# Patient Record
Sex: FEMALE | Race: WHITE | Employment: FULL TIME | URBAN - METROPOLITAN AREA
[De-identification: names, ages, dates, MRNs, and addresses within clinical notes are randomized per-mention and may not be internally consistent; named-entity substitution may affect disease eponyms.]

---

## 2019-03-16 ENCOUNTER — HOSPITAL ENCOUNTER (EMERGENCY)
Age: 39
Discharge: HOME | End: 2019-03-16
Attending: EMERGENCY MEDICINE
Payer: OTHER MISCELLANEOUS

## 2019-03-16 ENCOUNTER — APPOINTMENT (OUTPATIENT)
Dept: CT IMAGING | Age: 39
End: 2019-03-16
Payer: OTHER MISCELLANEOUS

## 2019-03-16 ENCOUNTER — APPOINTMENT (OUTPATIENT)
Dept: GENERAL RADIOLOGY | Age: 39
End: 2019-03-16
Payer: OTHER MISCELLANEOUS

## 2019-03-16 VITALS
BODY MASS INDEX: 36.8 KG/M2 | TEMPERATURE: 98.2 F | DIASTOLIC BLOOD PRESSURE: 82 MMHG | OXYGEN SATURATION: 99 % | SYSTOLIC BLOOD PRESSURE: 148 MMHG | RESPIRATION RATE: 16 BRPM | WEIGHT: 200 LBS | HEIGHT: 62 IN | HEART RATE: 82 BPM
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
## 2019-03-16 LAB
ABSOLUTE EOS #: 0.2 K/UL (ref 0–0.4)
ABSOLUTE IMMATURE GRANULOCYTE: ABNORMAL K/UL (ref 0–0.3)
ABSOLUTE LYMPH #: 1.7 K/UL (ref 1–4.8)
ABSOLUTE MONO #: 0.5 K/UL (ref 0.1–1.3)
ALBUMIN SERPL-MCNC: 4 G/DL (ref 3.5–5.2)
ALBUMIN/GLOBULIN RATIO: NORMAL (ref 1–2.5)
ALP BLD-CCNC: 45 U/L (ref 35–104)
ALT SERPL-CCNC: 11 U/L (ref 5–33)
ANION GAP SERPL CALCULATED.3IONS-SCNC: 10 MMOL/L (ref 9–17)
AST SERPL-CCNC: 13 U/L
BASOPHILS # BLD: 1 % (ref 0–2)
BASOPHILS ABSOLUTE: 0.1 K/UL (ref 0–0.2)
BILIRUB SERPL-MCNC: 0.38 MG/DL (ref 0.3–1.2)
BUN BLDV-MCNC: 12 MG/DL (ref 6–20)
BUN/CREAT BLD: NORMAL (ref 9–20)
CALCIUM SERPL-MCNC: 9.3 MG/DL (ref 8.6–10.4)
CHLORIDE BLD-SCNC: 99 MMOL/L (ref 98–107)
CO2: 28 MMOL/L (ref 20–31)
CREAT SERPL-MCNC: 0.59 MG/DL (ref 0.5–0.9)
DIFFERENTIAL TYPE: ABNORMAL
EOSINOPHILS RELATIVE PERCENT: 3 % (ref 0–4)
GFR AFRICAN AMERICAN: >60 ML/MIN
GFR NON-AFRICAN AMERICAN: >60 ML/MIN
GFR SERPL CREATININE-BSD FRML MDRD: NORMAL ML/MIN/{1.73_M2}
GFR SERPL CREATININE-BSD FRML MDRD: NORMAL ML/MIN/{1.73_M2}
GLUCOSE BLD-MCNC: 98 MG/DL (ref 70–99)
HCT VFR BLD CALC: 42.6 % (ref 36–46)
HEMOGLOBIN: 14.2 G/DL (ref 12–16)
IMMATURE GRANULOCYTES: ABNORMAL %
INR BLD: 0.9
LYMPHOCYTES # BLD: 19 % (ref 24–44)
MCH RBC QN AUTO: 30.1 PG (ref 26–34)
MCHC RBC AUTO-ENTMCNC: 33.4 G/DL (ref 31–37)
MCV RBC AUTO: 90 FL (ref 80–100)
MONOCYTES # BLD: 5 % (ref 1–7)
NRBC AUTOMATED: ABNORMAL PER 100 WBC
PARTIAL THROMBOPLASTIN TIME: 29.8 SEC (ref 24–36)
PDW BLD-RTO: 14.5 % (ref 11.5–14.9)
PLATELET # BLD: 279 K/UL (ref 150–450)
PLATELET ESTIMATE: ABNORMAL
PMV BLD AUTO: 8.9 FL (ref 6–12)
POTASSIUM SERPL-SCNC: 3.7 MMOL/L (ref 3.7–5.3)
PROTHROMBIN TIME: 12.5 SEC (ref 11.8–14.6)
RBC # BLD: 4.73 M/UL (ref 4–5.2)
RBC # BLD: ABNORMAL 10*6/UL
SEG NEUTROPHILS: 72 % (ref 36–66)
SEGMENTED NEUTROPHILS ABSOLUTE COUNT: 6.4 K/UL (ref 1.3–9.1)
SODIUM BLD-SCNC: 137 MMOL/L (ref 135–144)
TOTAL PROTEIN: 6.8 G/DL (ref 6.4–8.3)
WBC # BLD: 8.8 K/UL (ref 3.5–11)
WBC # BLD: ABNORMAL 10*3/UL

## 2019-03-16 PROCEDURE — 96374 THER/PROPH/DIAG INJ IV PUSH: CPT

## 2019-03-16 PROCEDURE — 80053 COMPREHEN METABOLIC PANEL: CPT

## 2019-03-16 PROCEDURE — 36415 COLL VENOUS BLD VENIPUNCTURE: CPT

## 2019-03-16 PROCEDURE — 85025 COMPLETE CBC W/AUTO DIFF WBC: CPT

## 2019-03-16 PROCEDURE — 6360000004 HC RX CONTRAST MEDICATION: Performed by: EMERGENCY MEDICINE

## 2019-03-16 PROCEDURE — 85730 THROMBOPLASTIN TIME PARTIAL: CPT

## 2019-03-16 PROCEDURE — 2580000003 HC RX 258: Performed by: EMERGENCY MEDICINE

## 2019-03-16 PROCEDURE — 99284 EMERGENCY DEPT VISIT MOD MDM: CPT

## 2019-03-16 PROCEDURE — 85610 PROTHROMBIN TIME: CPT

## 2019-03-16 PROCEDURE — 6360000002 HC RX W HCPCS: Performed by: EMERGENCY MEDICINE

## 2019-03-16 PROCEDURE — 73562 X-RAY EXAM OF KNEE 3: CPT

## 2019-03-16 PROCEDURE — 71260 CT THORAX DX C+: CPT

## 2019-03-16 RX ORDER — FENTANYL CITRATE 50 UG/ML
50 INJECTION, SOLUTION INTRAMUSCULAR; INTRAVENOUS ONCE
Status: COMPLETED | OUTPATIENT
Start: 2019-03-16 | End: 2019-03-16

## 2019-03-16 RX ORDER — 0.9 % SODIUM CHLORIDE 0.9 %
1000 INTRAVENOUS SOLUTION INTRAVENOUS ONCE
Status: COMPLETED | OUTPATIENT
Start: 2019-03-16 | End: 2019-03-16

## 2019-03-16 RX ORDER — SODIUM CHLORIDE 0.9 % (FLUSH) 0.9 %
10 SYRINGE (ML) INJECTION PRN
Status: DISCONTINUED | OUTPATIENT
Start: 2019-03-16 | End: 2019-03-16 | Stop reason: HOSPADM

## 2019-03-16 RX ORDER — FLUOXETINE 10 MG/1
10 TABLET, FILM COATED ORAL DAILY
COMMUNITY
End: 2022-02-22

## 2019-03-16 RX ORDER — LISINOPRIL 20 MG/1
20 TABLET ORAL 2 TIMES DAILY
COMMUNITY
End: 2022-02-22

## 2019-03-16 RX ORDER — 0.9 % SODIUM CHLORIDE 0.9 %
80 INTRAVENOUS SOLUTION INTRAVENOUS ONCE
Status: COMPLETED | OUTPATIENT
Start: 2019-03-16 | End: 2019-03-16

## 2019-03-16 RX ADMIN — SODIUM CHLORIDE 1000 ML: 9 INJECTION, SOLUTION INTRAVENOUS at 10:55

## 2019-03-16 RX ADMIN — SODIUM CHLORIDE 80 ML: 9 INJECTION, SOLUTION INTRAVENOUS at 12:00

## 2019-03-16 RX ADMIN — IOVERSOL 75 ML: 741 INJECTION INTRA-ARTERIAL; INTRAVENOUS at 12:00

## 2019-03-16 RX ADMIN — FENTANYL CITRATE 50 MCG: 50 INJECTION, SOLUTION INTRAMUSCULAR; INTRAVENOUS at 10:55

## 2019-03-16 RX ADMIN — Medication 10 ML: at 12:00

## 2019-03-16 ASSESSMENT — ENCOUNTER SYMPTOMS
COUGH: 0
BACK PAIN: 0
RESPIRATORY NEGATIVE: 1
SHORTNESS OF BREATH: 0
ABDOMINAL PAIN: 1
EYES NEGATIVE: 1

## 2019-03-16 ASSESSMENT — PAIN SCALES - GENERAL
PAINLEVEL_OUTOF10: 9
PAINLEVEL_OUTOF10: 6

## 2020-02-15 ENCOUNTER — APPOINTMENT (OUTPATIENT)
Dept: GENERAL RADIOLOGY | Age: 40
End: 2020-02-15
Payer: COMMERCIAL

## 2020-02-15 ENCOUNTER — HOSPITAL ENCOUNTER (EMERGENCY)
Age: 40
Discharge: HOME | End: 2020-02-15
Attending: EMERGENCY MEDICINE
Payer: COMMERCIAL

## 2020-02-15 VITALS
HEART RATE: 71 BPM | BODY MASS INDEX: 40.75 KG/M2 | RESPIRATION RATE: 16 BRPM | SYSTOLIC BLOOD PRESSURE: 150 MMHG | TEMPERATURE: 98.2 F | HEIGHT: 63 IN | DIASTOLIC BLOOD PRESSURE: 82 MMHG | OXYGEN SATURATION: 96 % | WEIGHT: 230 LBS

## 2020-02-15 PROCEDURE — 73610 X-RAY EXAM OF ANKLE: CPT

## 2020-02-15 PROCEDURE — 6360000002 HC RX W HCPCS: Performed by: EMERGENCY MEDICINE

## 2020-02-15 PROCEDURE — 99283 EMERGENCY DEPT VISIT LOW MDM: CPT

## 2020-02-15 PROCEDURE — 6370000000 HC RX 637 (ALT 250 FOR IP): Performed by: EMERGENCY MEDICINE

## 2020-02-15 PROCEDURE — 96372 THER/PROPH/DIAG INJ SC/IM: CPT

## 2020-02-15 RX ORDER — IBUPROFEN 600 MG/1
600 TABLET ORAL EVERY 6 HOURS PRN
COMMUNITY
End: 2022-02-22

## 2020-02-15 RX ORDER — ACETAMINOPHEN 325 MG/1
650 TABLET ORAL ONCE
Status: COMPLETED | OUTPATIENT
Start: 2020-02-15 | End: 2020-02-15

## 2020-02-15 RX ORDER — AMOXICILLIN 500 MG/1
500 CAPSULE ORAL 3 TIMES DAILY
COMMUNITY
End: 2022-02-22

## 2020-02-15 RX ORDER — HYDROCODONE BITARTRATE AND ACETAMINOPHEN 5; 325 MG/1; MG/1
1 TABLET ORAL EVERY 6 HOURS PRN
COMMUNITY
End: 2022-02-22

## 2020-02-15 RX ORDER — KETOROLAC TROMETHAMINE 30 MG/ML
30 INJECTION, SOLUTION INTRAMUSCULAR; INTRAVENOUS ONCE
Status: COMPLETED | OUTPATIENT
Start: 2020-02-15 | End: 2020-02-15

## 2020-02-15 RX ADMIN — ACETAMINOPHEN 650 MG: 325 TABLET ORAL at 19:56

## 2020-02-15 RX ADMIN — KETOROLAC TROMETHAMINE 30 MG: 30 INJECTION, SOLUTION INTRAMUSCULAR at 19:56

## 2020-02-15 ASSESSMENT — PAIN SCALES - GENERAL
PAINLEVEL_OUTOF10: 10
PAINLEVEL_OUTOF10: 6
PAINLEVEL_OUTOF10: 10

## 2020-02-15 ASSESSMENT — ENCOUNTER SYMPTOMS
ALLERGIC/IMMUNOLOGIC NEGATIVE: 1
RESPIRATORY NEGATIVE: 1
EYES NEGATIVE: 1
GASTROINTESTINAL NEGATIVE: 1

## 2020-02-15 ASSESSMENT — PAIN DESCRIPTION - PAIN TYPE: TYPE: ACUTE PAIN

## 2020-02-15 ASSESSMENT — PAIN DESCRIPTION - LOCATION: LOCATION: MOUTH

## 2020-02-15 ASSESSMENT — PAIN DESCRIPTION - FREQUENCY: FREQUENCY: CONTINUOUS

## 2020-02-15 ASSESSMENT — PAIN DESCRIPTION - DESCRIPTORS: DESCRIPTORS: THROBBING

## 2020-02-16 NOTE — ED PROVIDER NOTES
eMERGENCY dEPARTMENT eNCOUnter      Pt Name: Manuel Aviles  MRN: [id number]  [place] 1980  Date of evaluation: 2/15/2020      CHIEF COMPLAINT       Chief Complaint   Patient presents with    Facial Pain     s/p fall yesterday, pain to nose and upper jaw     Ankle Pain     left medial ankle pain           HISTORY OF PRESENT ILLNESS    Manuel Aviles is a 47 y.o. female who presents emergency department with continued facial pain after she had taken a fall yesterday and knocked out a couple of teeth and fractured a couple more. Patient was actually seen at the dentist had 1 tooth extracted she has 2 chipped teeth which are probably going to need either crowns or possible replacement. She did have a nerve block at that point time that is worn off and she is complaining of a lot of pain. Dentist did put her on amoxicillin and Norco she states the pain is still 7- 8 out of 10. Dentist also did a Panorex and had mentioned that he would like her to get a repeat x-ray and that is why she is here as well. I was uncertain as to why the dentist did not tell her to come back to the office to repeat the Panorex as the facial bone x-ray that we can do here will not be as good. REVIEW OF SYSTEMS       Review of Systems   Constitutional: Negative. HENT: Positive for dental problem. Eyes: Negative. Respiratory: Negative. Cardiovascular: Negative. Gastrointestinal: Negative. Endocrine: Negative. Genitourinary: Negative. Musculoskeletal: Positive for myalgias. Skin: Negative. Allergic/Immunologic: Negative. Neurological: Negative. Psychiatric/Behavioral: Negative. PAST MEDICAL HISTORY    has a past medical history of Hypertension. SURGICAL HISTORY      has a past surgical history that includes  section and [place] tooth extraction.     CURRENT MEDICATIONS       Current Discharge Medication List      CONTINUE these medications which have NOT CHANGED    Details amoxicillin (AMOXIL) 500 MG capsule Take 500 mg by mouth 3 times daily      ibuprofen (ADVIL;MOTRIN) 600 MG tablet Take 600 mg by mouth every 6 hours as needed for Pain      HYDROcodone-acetaminophen (NORCO) 5-325 MG per tablet Take 1 tablet by mouth every 6 hours as needed for Pain. lisinopril (PRINIVIL;ZESTRIL) 20 MG tablet Take 20 mg by mouth 2 times daily      FLUoxetine (PROZAC) 10 MG tablet Take 10 mg by mouth daily             ALLERGIES     has No Known Allergies. FAMILY HISTORY     has no family status information on file. family history is not on file. SOCIAL HISTORY      reports that she has been smoking cigarettes. She has been smoking about 0.50 packs per day. She has never used smokeless tobacco. She reports previous alcohol use. She reports that she does not use drugs. PHYSICAL EXAM     INITIAL VITALS:  height is 5' 3\" (1.6 m) and weight is 104.3 kg (230 lb). Her oral temperature is 98.2 °F (36.8 °C). Her blood pressure is 154/89 (abnormal) and her pulse is 71. Her respiration is 16. Physical Exam  Vitals signs reviewed. Constitutional:       General: She is not in acute distress. Appearance: Normal appearance. She is normal weight. She is not toxic-appearing. HENT:      Head: Normocephalic. Comments: Examination of the mouth reveals a missing left central incisor a chipped right central incisor and a chipped canine as well. Nose: Nose normal.      Mouth/Throat:      Mouth: Mucous membranes are moist.   Eyes:      Extraocular Movements: Extraocular movements intact. Pupils: Pupils are equal, round, and reactive to light. Neck:      Musculoskeletal: Normal range of motion. Cardiovascular:      Rate and Rhythm: Normal rate and regular rhythm. Pulses: Normal pulses. Heart sounds: Normal heart sounds. Pulmonary:      Effort: Pulmonary effort is normal.      Breath sounds: Normal breath sounds. Abdominal:      General: Abdomen is flat.  Bowel indicated             FINAL IMPRESSION      1. Sprain of left ankle, unspecified ligament, initial encounter    2. Facial pain          DISPOSITION/PLAN   DISPOSITION Decision To Discharge    I have reviewed the disposition diagnosis with the patient and or their family/guardian. I have answered their questions and given discharge instructions. They voiced understanding of these instructions and did not have any further questions or complaints. Reevaluation: Patient is feeling symptomatically better after having gotten Toradol and Tylenol at the same time. Her ankle x-ray is negative for any acute fracture. I rewrapped her up in an Ace wrap she is to follow back up with the dentist on Monday for repeat Panorex and she is to take both her Norco and ibuprofen at the same time.     PATIENT REFERRED TO:    ReTurn to see your dentist for further disposition and treatment on Monday  In 2 days        DISCHARGE MEDICATIONS:  Current Discharge Medication List          (Please note that portions of this note were completed with a voice recognition program.  Efforts were made to edit the dictations but occasionally words are mis-transcribed.)    Harrison MD  Attending Emergency Physician           Mala Marinelli MD  02/15/20 2029

## 2022-02-21 SDOH — HEALTH STABILITY: PHYSICAL HEALTH: ON AVERAGE, HOW MANY DAYS PER WEEK DO YOU ENGAGE IN MODERATE TO STRENUOUS EXERCISE (LIKE A BRISK WALK)?: 0 DAYS

## 2022-02-24 ENCOUNTER — HOSPITAL ENCOUNTER (OUTPATIENT)
Age: 42
Setting detail: SPECIMEN
Discharge: HOME | End: 2022-02-24

## 2022-02-24 ENCOUNTER — OFFICE VISIT (OUTPATIENT)
Dept: FAMILY MEDICINE CLINIC | Age: 42
End: 2022-02-24
Payer: COMMERCIAL

## 2022-02-24 VITALS
BODY MASS INDEX: 41.46 KG/M2 | SYSTOLIC BLOOD PRESSURE: 114 MMHG | DIASTOLIC BLOOD PRESSURE: 84 MMHG | HEART RATE: 68 BPM | OXYGEN SATURATION: 98 % | HEIGHT: 63 IN | TEMPERATURE: 98.1 F | WEIGHT: 234 LBS

## 2022-02-24 DIAGNOSIS — I10 ESSENTIAL HYPERTENSION: ICD-10-CM

## 2022-02-24 DIAGNOSIS — R63.5 WEIGHT GAIN: ICD-10-CM

## 2022-02-24 DIAGNOSIS — E55.9 VITAMIN D DEFICIENCY: ICD-10-CM

## 2022-02-24 DIAGNOSIS — E78.1 HIGH BLOOD TRIGLYCERIDES: ICD-10-CM

## 2022-02-24 DIAGNOSIS — Z00.00 PREVENTATIVE HEALTH CARE: ICD-10-CM

## 2022-02-24 DIAGNOSIS — E78.5 HYPERLIPIDEMIA, UNSPECIFIED HYPERLIPIDEMIA TYPE: ICD-10-CM

## 2022-02-24 DIAGNOSIS — I10 ESSENTIAL HYPERTENSION: Primary | ICD-10-CM

## 2022-02-24 DIAGNOSIS — Z76.89 ENCOUNTER TO ESTABLISH CARE: ICD-10-CM

## 2022-02-24 DIAGNOSIS — E61.1 IRON DEFICIENCY: ICD-10-CM

## 2022-02-24 DIAGNOSIS — F41.9 ANXIETY AND DEPRESSION: ICD-10-CM

## 2022-02-24 DIAGNOSIS — F32.A ANXIETY AND DEPRESSION: ICD-10-CM

## 2022-02-24 LAB
ALBUMIN SERPL-MCNC: 4.3 G/DL (ref 3.5–5.2)
ALBUMIN/GLOBULIN RATIO: 1.9 (ref 1–2.5)
ALP BLD-CCNC: 56 U/L (ref 35–104)
ALT SERPL-CCNC: 11 U/L (ref 5–33)
ANION GAP SERPL CALCULATED.3IONS-SCNC: 16 MMOL/L (ref 9–17)
AST SERPL-CCNC: 13 U/L
BILIRUB SERPL-MCNC: 0.3 MG/DL (ref 0.3–1.2)
BUN BLDV-MCNC: 11 MG/DL (ref 6–20)
CALCIUM SERPL-MCNC: 9.6 MG/DL (ref 8.6–10.4)
CHLORIDE BLD-SCNC: 103 MMOL/L (ref 98–107)
CHOLESTEROL/HDL RATIO: 6.1
CHOLESTEROL: 262 MG/DL
CO2: 24 MMOL/L (ref 20–31)
CREAT SERPL-MCNC: 0.62 MG/DL (ref 0.5–0.9)
ESTIMATED AVERAGE GLUCOSE: 123 MG/DL
FERRITIN: 243 UG/L (ref 13–150)
FOLATE: 8 NG/ML
GFR AFRICAN AMERICAN: >60 ML/MIN
GFR NON-AFRICAN AMERICAN: >60 ML/MIN
GFR SERPL CREATININE-BSD FRML MDRD: NORMAL ML/MIN/{1.73_M2}
GLUCOSE BLD-MCNC: 95 MG/DL (ref 70–99)
HBA1C MFR BLD: 5.9 % (ref 4–6)
HCT VFR BLD CALC: 44 % (ref 36.3–47.1)
HDLC SERPL-MCNC: 43 MG/DL
HEMOGLOBIN: 14.6 G/DL (ref 11.9–15.1)
IRON SATURATION: 21 % (ref 20–55)
IRON: 62 UG/DL (ref 37–145)
LDL CHOLESTEROL: 154 MG/DL (ref 0–130)
MCH RBC QN AUTO: 29.4 PG (ref 25.2–33.5)
MCHC RBC AUTO-ENTMCNC: 33.2 G/DL (ref 28.4–34.8)
MCV RBC AUTO: 88.7 FL (ref 82.6–102.9)
NRBC AUTOMATED: 0 PER 100 WBC
PDW BLD-RTO: 14.6 % (ref 11.8–14.4)
PLATELET # BLD: 308 K/UL (ref 138–453)
PMV BLD AUTO: 10.8 FL (ref 8.1–13.5)
POTASSIUM SERPL-SCNC: 4.9 MMOL/L (ref 3.7–5.3)
RBC # BLD: 4.96 M/UL (ref 3.95–5.11)
SODIUM BLD-SCNC: 143 MMOL/L (ref 135–144)
THYROXINE, FREE: 0.65 NG/DL (ref 0.93–1.7)
TOTAL IRON BINDING CAPACITY: 289 UG/DL (ref 250–450)
TOTAL PROTEIN: 6.6 G/DL (ref 6.4–8.3)
TRIGL SERPL-MCNC: 326 MG/DL
TSH SERPL DL<=0.05 MIU/L-ACNC: 7.75 MIU/L (ref 0.3–5)
UNSATURATED IRON BINDING CAPACITY: 227 UG/DL (ref 112–347)
VITAMIN B-12: 474 PG/ML (ref 232–1245)
VITAMIN D 25-HYDROXY: 39.1 NG/ML (ref 30–100)
WBC # BLD: 11.1 K/UL (ref 3.5–11.3)

## 2022-02-24 PROCEDURE — 99205 OFFICE O/P NEW HI 60 MIN: CPT | Performed by: NURSE PRACTITIONER

## 2022-02-24 RX ORDER — MELOXICAM 15 MG/1
15 TABLET ORAL DAILY
COMMUNITY
End: 2022-03-07

## 2022-02-24 RX ORDER — FLUOXETINE HYDROCHLORIDE 20 MG/1
20 CAPSULE ORAL DAILY
COMMUNITY
End: 2022-03-25 | Stop reason: SDUPTHER

## 2022-02-24 RX ORDER — LISINOPRIL AND HYDROCHLOROTHIAZIDE 20; 12.5 MG/1; MG/1
2 TABLET ORAL DAILY
COMMUNITY
End: 2022-03-25 | Stop reason: SDUPTHER

## 2022-02-24 SDOH — ECONOMIC STABILITY: FOOD INSECURITY: WITHIN THE PAST 12 MONTHS, YOU WORRIED THAT YOUR FOOD WOULD RUN OUT BEFORE YOU GOT MONEY TO BUY MORE.: NEVER TRUE

## 2022-02-24 SDOH — ECONOMIC STABILITY: FOOD INSECURITY: WITHIN THE PAST 12 MONTHS, THE FOOD YOU BOUGHT JUST DIDN'T LAST AND YOU DIDN'T HAVE MONEY TO GET MORE.: NEVER TRUE

## 2022-02-24 ASSESSMENT — ENCOUNTER SYMPTOMS
SHORTNESS OF BREATH: 0
ABDOMINAL DISTENTION: 0
DIARRHEA: 0
NAUSEA: 0
CONSTIPATION: 0
VOMITING: 0
CHEST TIGHTNESS: 0
ABDOMINAL PAIN: 0
BACK PAIN: 0
RHINORRHEA: 0
COUGH: 0
SORE THROAT: 0

## 2022-02-24 ASSESSMENT — PATIENT HEALTH QUESTIONNAIRE - PHQ9
SUM OF ALL RESPONSES TO PHQ9 QUESTIONS 1 & 2: 0
SUM OF ALL RESPONSES TO PHQ QUESTIONS 1-9: 0
SUM OF ALL RESPONSES TO PHQ QUESTIONS 1-9: 0
10. IF YOU CHECKED OFF ANY PROBLEMS, HOW DIFFICULT HAVE THESE PROBLEMS MADE IT FOR YOU TO DO YOUR WORK, TAKE CARE OF THINGS AT HOME, OR GET ALONG WITH OTHER PEOPLE: 0
1. LITTLE INTEREST OR PLEASURE IN DOING THINGS: 0
2. FEELING DOWN, DEPRESSED OR HOPELESS: 0
5. POOR APPETITE OR OVEREATING: 0
SUM OF ALL RESPONSES TO PHQ QUESTIONS 1-9: 0
4. FEELING TIRED OR HAVING LITTLE ENERGY: 0
8. MOVING OR SPEAKING SO SLOWLY THAT OTHER PEOPLE COULD HAVE NOTICED. OR THE OPPOSITE, BEING SO FIGETY OR RESTLESS THAT YOU HAVE BEEN MOVING AROUND A LOT MORE THAN USUAL: 0
9. THOUGHTS THAT YOU WOULD BE BETTER OFF DEAD, OR OF HURTING YOURSELF: 0
7. TROUBLE CONCENTRATING ON THINGS, SUCH AS READING THE NEWSPAPER OR WATCHING TELEVISION: 0
3. TROUBLE FALLING OR STAYING ASLEEP: 0
SUM OF ALL RESPONSES TO PHQ QUESTIONS 1-9: 0
6. FEELING BAD ABOUT YOURSELF - OR THAT YOU ARE A FAILURE OR HAVE LET YOURSELF OR YOUR FAMILY DOWN: 0

## 2022-02-24 ASSESSMENT — COLUMBIA-SUICIDE SEVERITY RATING SCALE - C-SSRS
2. HAVE YOU ACTUALLY HAD ANY THOUGHTS OF KILLING YOURSELF?: NO
1. WITHIN THE PAST MONTH, HAVE YOU WISHED YOU WERE DEAD OR WISHED YOU COULD GO TO SLEEP AND NOT WAKE UP?: NO
6. HAVE YOU EVER DONE ANYTHING, STARTED TO DO ANYTHING, OR PREPARED TO DO ANYTHING TO END YOUR LIFE?: NO

## 2022-02-24 ASSESSMENT — SOCIAL DETERMINANTS OF HEALTH (SDOH): HOW HARD IS IT FOR YOU TO PAY FOR THE VERY BASICS LIKE FOOD, HOUSING, MEDICAL CARE, AND HEATING?: NOT HARD AT ALL

## 2022-02-24 NOTE — PROGRESS NOTES
Del Lu, APRN-[facility]  [address] [facility]  [address] [address], [address]  [address] [address]  Dept: [phone]  Dept Fax: [phone]    Patient ID: Prabha Mcclelland is a 38 y.o. female. HPI:  Prabha Mcclelland is a 38 y.o. female who presents to the office today for a first visit and to establish a relationship with a new primary care physician. Today, the patient denies any complaints. Pt denies any fever or chills. Pt today denies any HA, chest pain, or SOB. Pt denies any N/V/D/C or abdominal pain. Patient does report a history of HTN (on Lisinopril-HCTZ 20-12.5 mg daily), HLD (not currently taking any medication for this), Hypothyroidism (was taking Synthroid 125 mcg daily but unsure as to why; she has not been taking it for the last 6 weeks), anxiety and depression (on Prozac 20 mg daily) and vitamin d deficiency. Patient reports compliance with all medications and is tolerating them without side effects. She does report frustration with her inability to loose weight despite diet and exercise. She is inquiring about Adipex. Previous office notes, labs and diagnostic studies were reviewed prior to and during encounter. The patient's past medical, surgical, social, and family history as well as her current medications and allergies were reviewed as documented in today's encounter by ABRAHAM Huerta.          Preventative Care include:   o Colonoscopy: N/A   o Mammogram: Denies   o Gynecological: Has not seen anyone in years; previous patient of Dr. Katrina Castillo  o Smoking history: Denies   o Alcohol consumption: socially  o Drug use: Denies     Current Outpatient Medications on File Prior to Visit   Medication Sig Dispense Refill    lisinopril-hydroCHLOROthiazide (PRINZIDE;ZESTORETIC) 20-12.5 MG per tablet Take 2 tablets by mouth daily      FLUoxetine (PROZAC) 20 MG capsule Take 20 mg by mouth daily      meloxicam (MOBIC) 15 MG tablet Take 15 mg by mouth daily      VITAMIN D PO Take 10,000 Units by mouth daily      COLLAGEN PO Take by mouth       No current facility-administered medications on file prior to visit. SUBJECTIVE:      Review of Systems   Constitutional: Positive for unexpected weight change (inability to loose weight despite diet and exercise). Negative for activity change, fatigue and fever. HENT: Negative for congestion, ear pain, rhinorrhea and sore throat. Respiratory: Negative for cough, chest tightness and shortness of breath. Cardiovascular: Negative for chest pain and palpitations. Gastrointestinal: Negative for abdominal distention, abdominal pain, constipation, diarrhea, nausea and vomiting. Endocrine: Negative for polydipsia, polyphagia and polyuria. Genitourinary: Negative for difficulty urinating and dysuria. Musculoskeletal: Positive for arthralgias (stable on Meloxicam 15 mg daily) and myalgias (stable on Meloxicam 15 mg dialy). Negative for back pain. Skin: Negative for rash. Neurological: Negative for dizziness, weakness, light-headedness and headaches. Hematological: Negative for adenopathy. Psychiatric/Behavioral: Positive for dysphoric mood (stable on Prozac 20 mg daily). Negative for agitation and behavioral problems. The patient is nervous/anxious (stable on Prozac 20 mg daily). OBJECTIVE: /84   Pulse 68   Temp 98.1 °F (36.7 °C)   Ht 5' 3\" (1.6 m)   Wt 234 lb (106.1 kg)   LMP 02/17/2022   SpO2 98%   Breastfeeding No   BMI 41.45 kg/m²     Physical Exam  Vitals and nursing note reviewed. Constitutional:       General: She is not in acute distress. Appearance: Normal appearance. She is well-developed. HENT:      Head: Normocephalic and atraumatic. Cardiovascular:      Rate and Rhythm: Normal rate and regular rhythm. Heart sounds: Normal heart sounds. No murmur heard. Pulmonary:      Effort: Pulmonary effort is normal. No respiratory distress.       Breath sounds: Normal breath sounds. Chest:      Chest wall: No tenderness. Abdominal:      General: Bowel sounds are normal.      Palpations: Abdomen is soft. Tenderness: There is no abdominal tenderness. Musculoskeletal:         General: Normal range of motion. Cervical back: Normal range of motion. Right lower leg: No edema. Left lower leg: No edema. Skin:     General: Skin is warm and dry. Findings: No rash. Neurological:      Mental Status: She is alert and oriented to person, place, and time. Psychiatric:         Mood and Affect: Mood normal.         Behavior: Behavior is cooperative. ASSESSMENT:   Diagnosis Orders   1. Essential hypertension  CBC    Comprehensive Metabolic Panel    Lipid Panel   2. Encounter to establish care     3. Preventative health care  CBC    Comprehensive Metabolic Panel    Hemoglobin A1C    Lipid Panel   4. Hyperlipidemia, unspecified hyperlipidemia type  CBC    Comprehensive Metabolic Panel    Lipid Panel   5. High blood triglycerides     6. Anxiety and depression  TSH    T4, Free   7. Vitamin D deficiency  Vitamin D 25 Hydroxy   8. Iron deficiency  Iron and TIBC    Vitamin B12 & Folate    Ferritin   9. Weight gain        PLAN:  1. Encounter to establish care  2. Preventative health care  - Previous patient of Dr. Bart Butts  - Last encounter was April 2021  - We did discuss in detail the and the recommended preventative screening guidelines (HTN, HLD, DM, breast, cervical cancer, colorectal cancer and osteoporosis). - Detailed education was provided on the patient's after visit summary.  - Will order above noted labs and discuss them at follow up visit. - Has not had a PAP in a couple of years. Her last GYN was Dr. Yakov Jackson  - Did give her the option of scheduling with me or referring to GYN  - Annual mammograms as recommended beginning at the age of 36, unless otherwise indicated. - CBC; Future  - Comprehensive Metabolic Panel;  Future  - Hemoglobin A1C; Future  - Lipid Panel; Future    3. Essential hypertension  - Stable: Medication re-filled as needed, con't medications as prescribed, con't current tx plan  - BP in office today noted; 114/84  - Continue Lisinopril/HCTZ 20-12.5 mg daily as previously prescribed. - CBC; Future  - Comprehensive Metabolic Panel; Future  - Lipid Panel; Future    4. Hyperlipidemia, unspecified hyperlipidemia type  5. High blood triglycerides  - Total Cholesterol (228) and LDL (146) noted from 2021 in [facility]  - Triglycerides (201) noted in Care Everywhere  - Will order updated labs and call with results  - Please have patient decrease, fats, carbohydrates, and engage in a healthier diet overall.  - Increase exercise and aim for 150 minutes of aerobic activity per week. - CBC; Future  - Comprehensive Metabolic Panel; Future  - Lipid Panel; Future    6. Anxiety and depression  - Stable: Medication re-filled as needed, con't medications as prescribed, con't current tx plan  - Continue with Prozac 20 mg daily  as previously prescribed. - Pt encouraged to deep breath and relax through anxious moments. - Offered reassurance and allowed to ventilate feelings and ask questions.   - Non-pharmological coping methods encouraged. - TSH; Future  - T4, Free; Future    7. Vitamin D deficiency  - Vitamin D level (10.1) noted from 2021 in [facility]  - Will order updated labs and call with results. - Vitamin D 25 Hydroxy; Future    8. Iron deficiency  - Will order updated labs and call with results  - Iron and TIBC; Future  - Vitamin B12 & Folate; Future  - Ferritin; Future    9.  Weight gain  - We did discuss dietary modifications, exercise and low carb diet  - Will have her back in a month to discuss the possibility of starting Adipex     - Medications, labs, diagnostic studies, consultations and follow-up as documented in this encounter.     - On this date February 24, 2022,  I have spent greater than 50% of this 60 minute visit reviewing previous notes, test results and/or face to face with the patient discussing the diagnoses, importance of compliance with the treatment plan, counseling, coordinating care as well as documenting on the day of the visit.      Jasper Mathis, MARCOS-CNP

## 2022-03-07 RX ORDER — MELOXICAM 15 MG/1
TABLET ORAL
Qty: 90 TABLET | Refills: 1 | Status: SHIPPED | OUTPATIENT
Start: 2022-03-07 | End: 2022-03-25 | Stop reason: ALTCHOICE

## 2022-03-25 ENCOUNTER — OFFICE VISIT (OUTPATIENT)
Dept: FAMILY MEDICINE CLINIC | Age: 42
End: 2022-03-25
Payer: COMMERCIAL

## 2022-03-25 VITALS
OXYGEN SATURATION: 98 % | DIASTOLIC BLOOD PRESSURE: 78 MMHG | WEIGHT: 229.4 LBS | HEART RATE: 83 BPM | BODY MASS INDEX: 40.64 KG/M2 | SYSTOLIC BLOOD PRESSURE: 110 MMHG | TEMPERATURE: 97.9 F

## 2022-03-25 DIAGNOSIS — R63.5 WEIGHT GAIN: ICD-10-CM

## 2022-03-25 DIAGNOSIS — I10 ESSENTIAL HYPERTENSION: Primary | ICD-10-CM

## 2022-03-25 DIAGNOSIS — F32.A ANXIETY AND DEPRESSION: ICD-10-CM

## 2022-03-25 DIAGNOSIS — F41.9 ANXIETY AND DEPRESSION: ICD-10-CM

## 2022-03-25 PROCEDURE — 99214 OFFICE O/P EST MOD 30 MIN: CPT | Performed by: NURSE PRACTITIONER

## 2022-03-25 RX ORDER — CELECOXIB 100 MG/1
CAPSULE ORAL
COMMUNITY
Start: 2022-03-16 | End: 2022-06-22 | Stop reason: ALTCHOICE

## 2022-03-25 RX ORDER — LISINOPRIL AND HYDROCHLOROTHIAZIDE 20; 12.5 MG/1; MG/1
2 TABLET ORAL DAILY
Qty: 90 TABLET | Refills: 1 | Status: SHIPPED | OUTPATIENT
Start: 2022-03-25 | End: 2022-03-31 | Stop reason: SDUPTHER

## 2022-03-25 RX ORDER — PHENTERMINE HYDROCHLORIDE 37.5 MG/1
37.5 TABLET ORAL
Qty: 30 TABLET | Refills: 0 | Status: SHIPPED | OUTPATIENT
Start: 2022-03-25 | End: 2022-04-24

## 2022-03-25 RX ORDER — FLUOXETINE HYDROCHLORIDE 20 MG/1
20 CAPSULE ORAL DAILY
Qty: 90 CAPSULE | Refills: 1 | Status: SHIPPED | OUTPATIENT
Start: 2022-03-25 | End: 2022-08-03

## 2022-03-25 RX ORDER — PNV NO.95/FERROUS FUM/FOLIC AC 28MG-0.8MG
TABLET ORAL
COMMUNITY
Start: 2022-02-25

## 2022-03-25 RX ORDER — AMPICILLIN TRIHYDRATE 250 MG
CAPSULE ORAL
COMMUNITY
Start: 2022-02-25

## 2022-03-25 ASSESSMENT — PATIENT HEALTH QUESTIONNAIRE - PHQ9
3. TROUBLE FALLING OR STAYING ASLEEP: 0
10. IF YOU CHECKED OFF ANY PROBLEMS, HOW DIFFICULT HAVE THESE PROBLEMS MADE IT FOR YOU TO DO YOUR WORK, TAKE CARE OF THINGS AT HOME, OR GET ALONG WITH OTHER PEOPLE: 0
SUM OF ALL RESPONSES TO PHQ QUESTIONS 1-9: 0
6. FEELING BAD ABOUT YOURSELF - OR THAT YOU ARE A FAILURE OR HAVE LET YOURSELF OR YOUR FAMILY DOWN: 0
SUM OF ALL RESPONSES TO PHQ QUESTIONS 1-9: 0
7. TROUBLE CONCENTRATING ON THINGS, SUCH AS READING THE NEWSPAPER OR WATCHING TELEVISION: 0
SUM OF ALL RESPONSES TO PHQ QUESTIONS 1-9: 0
SUM OF ALL RESPONSES TO PHQ QUESTIONS 1-9: 0
1. LITTLE INTEREST OR PLEASURE IN DOING THINGS: 0
4. FEELING TIRED OR HAVING LITTLE ENERGY: 0
SUM OF ALL RESPONSES TO PHQ9 QUESTIONS 1 & 2: 0
9. THOUGHTS THAT YOU WOULD BE BETTER OFF DEAD, OR OF HURTING YOURSELF: 0
2. FEELING DOWN, DEPRESSED OR HOPELESS: 0
8. MOVING OR SPEAKING SO SLOWLY THAT OTHER PEOPLE COULD HAVE NOTICED. OR THE OPPOSITE, BEING SO FIGETY OR RESTLESS THAT YOU HAVE BEEN MOVING AROUND A LOT MORE THAN USUAL: 0
5. POOR APPETITE OR OVEREATING: 0

## 2022-03-25 ASSESSMENT — ENCOUNTER SYMPTOMS
RHINORRHEA: 0
CONSTIPATION: 0
SHORTNESS OF BREATH: 0
ABDOMINAL DISTENTION: 0
VOMITING: 0
SORE THROAT: 0
NAUSEA: 0
DIARRHEA: 0
BACK PAIN: 0
CHEST TIGHTNESS: 0
ABDOMINAL PAIN: 0
COUGH: 0

## 2022-03-25 NOTE — PROGRESS NOTES
Joy Franco, APRN-CNP  [address] [facility]  [id number]  [address] [address]  Dept: [phone]  Dept Fax: [phone]     PATIENT ID: Minoo Sparrow is a 38 y.o. female. HPI:  Established pt here today for f/u on chronic medical problems; HTN anxiety, depression, go over labs and/or diagnostic studies, and medication refills. Pt denies any fever or chills. Pt today denies any HA, chest pain, or SOB. Pt denies any N/V/D/C or abdominal pain. Today, patient is doing well on current tx and voices no concerns. She does express concern regarding the inability to loose weight. She has been watching her carbs, sugars and exercising regularly but is not experiencing the weight loss she is expecting. She does relate that she did follow up with ortho, Dr. Juana Avila who switched her meloxicam to Celebrex. She relates that she is doing well with the new medication and tolerating it well without significant side effects. My previous office notes, labs and diagnostic studies were reviewed prior to and during encounter. The patient's past medical, surgical, social, and family history as well as current medications and allergies were reviewed as documented in today's encounter by ABRAHAM Johnson. Current Outpatient Medications on File Prior to Visit   Medication Sig Dispense Refill    celecoxib (CELEBREX) 100 MG capsule TAKE 1 CAPSULE (100 MG TOTAL) BY MOUTH IN THE MORNING.  Red Yeast Rice 600 MG CAPS       Omega-3 Fatty Acids (FISH OIL OMEGA-3) 1000 MG CAPS       VITAMIN D PO Take 10,000 Units by mouth daily       No current facility-administered medications on file prior to visit. SUBJECTIVE:     Review of Systems   Constitutional: Negative for activity change, fatigue and fever. HENT: Negative for congestion, ear pain, rhinorrhea and sore throat. Respiratory: Negative for cough, chest tightness and shortness of breath. Cardiovascular: Negative for chest pain and palpitations. Gastrointestinal: Negative for abdominal distention, abdominal pain, constipation, diarrhea, nausea and vomiting. Endocrine: Negative for polydipsia, polyphagia and polyuria. Genitourinary: Negative for difficulty urinating and dysuria. Musculoskeletal: Positive for arthralgias (stable on Celebrex 200 mg daily). Negative for back pain and myalgias. Skin: Negative for rash. Neurological: Negative for dizziness, weakness, light-headedness and headaches. Hematological: Negative for adenopathy. Psychiatric/Behavioral: Positive for dysphoric mood (stable on Prozac 20 mg daily). Negative for agitation and behavioral problems. The patient is not nervous/anxious. OBJECTIVE:  /78   Pulse 83   Temp 97.9 °F (36.6 °C)   Wt 229 lb 6.4 oz (104.1 kg)   LMP 03/14/2022 (Exact Date)   SpO2 98%   BMI 40.64 kg/m²     Physical Exam  Vitals and nursing note reviewed. Constitutional:       General: She is not in acute distress. Appearance: Normal appearance. She is well-developed. HENT:      Head: Normocephalic and atraumatic. Cardiovascular:      Rate and Rhythm: Normal rate and regular rhythm. Heart sounds: Normal heart sounds. No murmur heard. Pulmonary:      Effort: Pulmonary effort is normal. No respiratory distress. Breath sounds: Normal breath sounds. Chest:      Chest wall: No tenderness. Abdominal:      General: Bowel sounds are normal.      Palpations: Abdomen is soft. Tenderness: There is no abdominal tenderness. Musculoskeletal:         General: Normal range of motion. Cervical back: Normal range of motion. Right lower leg: No edema. Left lower leg: No edema. Skin:     General: Skin is warm and dry. Findings: No rash. Neurological:      Mental Status: She is alert and oriented to person, place, and time.    Psychiatric:         Mood and Affect: Mood normal.         Behavior: Behavior is cooperative. ASSESSMENT:   Diagnosis Orders   1. Essential hypertension  lisinopril-hydroCHLOROthiazide (PRINZIDE;ZESTORETIC) 20-12.5 MG per tablet   2. Anxiety and depression  FLUoxetine (PROZAC) 20 MG capsule   3. BMI 40.0-44.9, adult ([facility])  phentermine (ADIPEX-P) 37.5 MG tablet   4. Weight gain  phentermine (ADIPEX-P) 37.5 MG tablet     PLAN:  1. Essential hypertension  - Stable: Medication re-filled as needed, con't medications as prescribed, con't current tx plan  - BP in office today noted; 110/78   - Continue Lisinopril-HCTZ 20-12.5 mg daily as previously prescribed. 2. Anxiety and depression  - Stable: Medication re-filled as needed, con't medications as prescribed, con't current tx plan  - Continue with Prozac 20 mg daily as previously prescribed. - Pt encouraged to deep breath and relax through anxious moments   - Offered reassurance and allowed to ventilate feelings and ask questions.   - Non-pharmological coping methods discussed. 3. BMI 40.0-44.9, adult ([facility] [address]  4. Weight gain  - BP and HR noted on today's visit  - I did d/w pt about starting Adipex and treating for 3 months and will need to be seen monthly for BP and weight check and then will need to take a 6 month hoLiday off the medication.   - I also did stress the importance of exercise to aim for 150 minutes of aerobic activity a week and to watch her diet and calorie intake. - Discussion had with patient regarding the prescribing of a controlled substance for weight loss (Adipex), the provider is required by law to see the patient  an appointment every thirty days. - This is neccessary to document an accurate weight and blood pressure. - It is also essential to assess the patients efforts to lose weight and to ensure there are no contraindications or adverse effects.   - Medication contract discussed, signed and scanned into media.   - Patient verbalized understanding.     - Rest of systems unchanged, continue current treatments. - On this date March 25, 2022,  I have spent greater than 50% of this visit reviewing previous notes, test results and/or face to face with the patient discussing the diagnoses, importance of compliance with the treatment plan, counseling, coordinating care as well as documenting on the day of the visit.      Aj Wilson, MARCOS-CNP

## 2022-03-31 ENCOUNTER — PATIENT MESSAGE (OUTPATIENT)
Dept: FAMILY MEDICINE CLINIC | Age: 42
End: 2022-03-31

## 2022-03-31 DIAGNOSIS — I10 ESSENTIAL HYPERTENSION: ICD-10-CM

## 2022-03-31 RX ORDER — LISINOPRIL AND HYDROCHLOROTHIAZIDE 20; 12.5 MG/1; MG/1
2 TABLET ORAL DAILY
Qty: 180 TABLET | Refills: 1 | Status: SHIPPED | OUTPATIENT
Start: 2022-03-31 | End: 2022-04-12 | Stop reason: SDUPTHER

## 2022-03-31 NOTE — TELEPHONE ENCOUNTER
From: Yadira Andrew  Sent: 3/31/2022 12:12 PM EDT  To: Karen Bustamante  Clinical Pool  Subject: Refills    I just confirmed with the pharmacy that [place] sent over the script for 90 pills for the Lisinopril but I take 2 per day which means the script should be for 180 pills unless she is cutting my medication back to one per day, which is fine with me. Please let me know.

## 2022-04-12 RX ORDER — LISINOPRIL AND HYDROCHLOROTHIAZIDE 20; 12.5 MG/1; MG/1
2 TABLET ORAL DAILY
Qty: 60 TABLET | Refills: 0 | Status: SHIPPED | OUTPATIENT
Start: 2022-04-12 | End: 2022-04-25 | Stop reason: SDUPTHER

## 2022-04-12 NOTE — TELEPHONE ENCOUNTER
Medication re-sent, thanks. Please let her know I only sent a 30 day supply to the local pharmacy and the 90 day supply went to the mail away.

## 2022-04-13 DIAGNOSIS — I10 ESSENTIAL HYPERTENSION: ICD-10-CM

## 2022-04-13 RX ORDER — LISINOPRIL AND HYDROCHLOROTHIAZIDE 20; 12.5 MG/1; MG/1
2 TABLET ORAL DAILY
Qty: 180 TABLET | Refills: 1 | Status: SHIPPED | OUTPATIENT
Start: 2022-04-13 | End: 2022-10-13

## 2022-04-25 ENCOUNTER — OFFICE VISIT (OUTPATIENT)
Dept: FAMILY MEDICINE CLINIC | Age: 42
End: 2022-04-25
Payer: COMMERCIAL

## 2022-04-25 VITALS
BODY MASS INDEX: 40.39 KG/M2 | DIASTOLIC BLOOD PRESSURE: 76 MMHG | WEIGHT: 228 LBS | OXYGEN SATURATION: 98 % | TEMPERATURE: 98.3 F | HEART RATE: 86 BPM | SYSTOLIC BLOOD PRESSURE: 110 MMHG

## 2022-04-25 DIAGNOSIS — R63.5 WEIGHT GAIN: Primary | ICD-10-CM

## 2022-04-25 PROCEDURE — 99213 OFFICE O/P EST LOW 20 MIN: CPT | Performed by: NURSE PRACTITIONER

## 2022-04-25 RX ORDER — PHENTERMINE HYDROCHLORIDE 37.5 MG/1
37.5 TABLET ORAL
Qty: 30 TABLET | Refills: 0 | Status: SHIPPED | OUTPATIENT
Start: 2022-04-25 | End: 2022-05-24 | Stop reason: SDUPTHER

## 2022-04-25 ASSESSMENT — ENCOUNTER SYMPTOMS
ABDOMINAL DISTENTION: 0
CHEST TIGHTNESS: 0
BACK PAIN: 0
SHORTNESS OF BREATH: 0
RHINORRHEA: 0
VOMITING: 0
CONSTIPATION: 0
NAUSEA: 0
DIARRHEA: 0
COUGH: 0
ABDOMINAL PAIN: 0
SORE THROAT: 0

## 2022-04-25 NOTE — PROGRESS NOTES
Landry Davis, APRN-CNP  [address] [facility]  [address] [address], [address]  [address] [address]  Dept: [phone]  Dept Fax: [phone]     PATIENT ID: Sheri Navarro is a 38 y.o. female. HPI:  Pt here today for BP and weight check while being on Adipex. A refill of the medication is needed today. Pt denies any fever or chills. Pt today denies any HA, chest pain, or SOB. Pt denies any N/V/D/C or abdominal pain. Since her last visit, she has lost 1 lbs for a cumulative total of 7 lbs. Patient reports compliance with the Adipex and is tolerating it without side effects Otherwise patient is doing well on current tx and voices no other concerns today. My previous office notes, labs and diagnostic studies were reviewed prior to and during encounter. The patient's past medical, surgical, social, and family history as well as his current medications and allergies were reviewed as documented in today's encounter by ABRAHAM Thompson. Current Outpatient Medications on File Prior to Visit   Medication Sig Dispense Refill    lisinopril-hydroCHLOROthiazide (PRINZIDE;ZESTORETIC) 20-12.5 MG per tablet Take 2 tablets by mouth daily 180 tablet 1    celecoxib (CELEBREX) 100 MG capsule TAKE 1 CAPSULE (100 MG TOTAL) BY MOUTH IN THE MORNING.  Red Yeast Rice 600 MG CAPS       Omega-3 Fatty Acids (FISH OIL OMEGA-3) 1000 MG CAPS       FLUoxetine (PROZAC) 20 MG capsule Take 1 capsule by mouth daily 90 capsule 1    VITAMIN D PO Take 10,000 Units by mouth daily       No current facility-administered medications on file prior to visit. SUBJECTIVE:     Review of Systems   Constitutional: Negative for activity change, fatigue and fever. HENT: Negative for congestion, ear pain, rhinorrhea and sore throat. Respiratory: Negative for cough, chest tightness and shortness of breath. Cardiovascular: Negative for chest pain and palpitations.    Gastrointestinal: Negative for abdominal distention, abdominal pain, constipation, diarrhea, nausea and vomiting. Endocrine: Negative for polydipsia, polyphagia and polyuria. Genitourinary: Negative for difficulty urinating and dysuria. Musculoskeletal: Negative for arthralgias, back pain and myalgias. Skin: Negative for rash. Neurological: Negative for dizziness, weakness, light-headedness and headaches. Hematological: Negative for adenopathy. Psychiatric/Behavioral: Negative for agitation and behavioral problems. The patient is not nervous/anxious. OBJECTIVE: /76   Pulse 86   Temp 98.3 °F (36.8 °C)   Wt 228 lb (103.4 kg)   LMP 04/12/2022 (Exact Date)   SpO2 98%   BMI 40.39 kg/m²      Physical Exam  Vitals reviewed. Constitutional:       General: She is not in acute distress. HENT:      Head: Normocephalic and atraumatic. Pulmonary:      Effort: Pulmonary effort is normal. No respiratory distress. Neurological:      General: No focal deficit present. Mental Status: She is alert and oriented to person, place, and time. Psychiatric:         Mood and Affect: Mood normal.       ASSESSMENT   Diagnosis Orders   1. Weight gain  phentermine (ADIPEX-P) 37.5 MG tablet   2. BMI 40.0-44.9, adult ([facility])  phentermine (ADIPEX-P) 37.5 MG tablet     PLAN:  1. Weight gain  2. BMI 40.0-44.9, adult ([facility] [address]  - Doing well, as she has lost 7 lbs since her last visit  - Continue with Adipex 37.5 mg daily as previously prescribed. - This is month 2/3  - I also did stress the importance of exercise to aim for 150 minutes of aerobic activity a week and to watch her diet and calorie intake. - Will continue with monthly BP and weight checks. - Rest of systems unchanged, continue current treatments.     - On this date April 25, 2022,  I have spent greater than 50% of this visit reviewing previous notes, test results and/or face to face with the patient discussing the diagnoses, importance of compliance with the treatment plan, counseling, coordinating care as well as documenting on the day of the visit.      MARCOS Max-CNP

## 2022-05-09 ENCOUNTER — OFFICE VISIT (OUTPATIENT)
Dept: FAMILY MEDICINE CLINIC | Age: 42
End: 2022-05-09
Payer: COMMERCIAL

## 2022-05-09 VITALS
TEMPERATURE: 98.1 F | BODY MASS INDEX: 39.86 KG/M2 | OXYGEN SATURATION: 97 % | HEART RATE: 72 BPM | SYSTOLIC BLOOD PRESSURE: 110 MMHG | DIASTOLIC BLOOD PRESSURE: 70 MMHG | WEIGHT: 225 LBS

## 2022-05-09 DIAGNOSIS — J01.90 ACUTE BACTERIAL SINUSITIS: Primary | ICD-10-CM

## 2022-05-09 DIAGNOSIS — J02.9 SORE THROAT: ICD-10-CM

## 2022-05-09 DIAGNOSIS — B96.89 ACUTE BACTERIAL SINUSITIS: Primary | ICD-10-CM

## 2022-05-09 LAB — S PYO AG THROAT QL: NORMAL

## 2022-05-09 PROCEDURE — 87880 STREP A ASSAY W/OPTIC: CPT | Performed by: NURSE PRACTITIONER

## 2022-05-09 PROCEDURE — 99213 OFFICE O/P EST LOW 20 MIN: CPT | Performed by: NURSE PRACTITIONER

## 2022-05-09 RX ORDER — AMOXICILLIN AND CLAVULANATE POTASSIUM 875; 125 MG/1; MG/1
1 TABLET, FILM COATED ORAL 2 TIMES DAILY
Qty: 20 TABLET | Refills: 0 | Status: SHIPPED | OUTPATIENT
Start: 2022-05-09 | End: 2022-05-19

## 2022-05-09 RX ORDER — FLUTICASONE PROPIONATE 50 MCG
1 SPRAY, SUSPENSION (ML) NASAL DAILY
Qty: 16 G | Refills: 0 | Status: SHIPPED | OUTPATIENT
Start: 2022-05-09 | End: 2023-05-09

## 2022-05-09 ASSESSMENT — ENCOUNTER SYMPTOMS
SINUS PAIN: 1
SINUS PRESSURE: 1
DIARRHEA: 0
RHINORRHEA: 0
CHEST TIGHTNESS: 0
SORE THROAT: 1
CONSTIPATION: 0
ABDOMINAL DISTENTION: 0
COUGH: 1
NAUSEA: 0
SHORTNESS OF BREATH: 0
BACK PAIN: 0
ABDOMINAL PAIN: 0
VOMITING: 0

## 2022-05-09 NOTE — PROGRESS NOTES
Bedelia Dubin, APRN-CNP  [address] [facility]  [id number], [address]  [address] [address]  Dept: [phone]  Dept Fax: [phone]     PATIENT ID: Josh Matson is a 38 y.o. female. HPI:  Established pt here today for an acute visit secondary to cough, congestion, bilateral ear pain and pressure and sore throat. Symptoms started on Friday and are not improving with over the counter medication. Pt denies any fever or chills. Pt today denies any HA, chest pain, or SOB. Pt denies any N/V/D/C or abdominal pain. Otherwise pt doing well on current tx and voices no other concerns. My previous office notes, labs and diagnostic studies were reviewed prior to and during encounter. The patient's past medical, surgical, social, and family history as well as current medications and allergies were reviewed as documented in today's encounter by ABRAHAM Sylvester. Current Outpatient Medications on File Prior to Visit   Medication Sig Dispense Refill    phentermine (ADIPEX-P) 37.5 MG tablet Take 1 tablet by mouth every morning (before breakfast) for 30 days. 30 tablet 0    lisinopril-hydroCHLOROthiazide (PRINZIDE;ZESTORETIC) 20-12.5 MG per tablet Take 2 tablets by mouth daily 180 tablet 1    celecoxib (CELEBREX) 100 MG capsule TAKE 1 CAPSULE (100 MG TOTAL) BY MOUTH IN THE MORNING.  Red Yeast Rice 600 MG CAPS       Omega-3 Fatty Acids (FISH OIL OMEGA-3) 1000 MG CAPS       FLUoxetine (PROZAC) 20 MG capsule Take 1 capsule by mouth daily 90 capsule 1    VITAMIN D PO Take 10,000 Units by mouth daily       No current facility-administered medications on file prior to visit. SUBJECTIVE:     Review of Systems   Constitutional: Negative for activity change, fatigue and fever. HENT: Positive for congestion, ear pain (and pressure; bilaterally; worse on the right), sinus pressure, sinus pain and sore throat. Negative for rhinorrhea.     Respiratory: Positive for cough. Negative for chest tightness and shortness of breath. Cardiovascular: Negative for chest pain and palpitations. Gastrointestinal: Negative for abdominal distention, abdominal pain, constipation, diarrhea, nausea and vomiting. Endocrine: Negative for polydipsia, polyphagia and polyuria. Genitourinary: Negative for difficulty urinating and dysuria. Musculoskeletal: Negative for arthralgias, back pain and myalgias. Skin: Negative for rash. Neurological: Negative for dizziness, weakness, light-headedness and headaches. Hematological: Negative for adenopathy. Psychiatric/Behavioral: Negative for agitation and behavioral problems. The patient is not nervous/anxious. OBJECTIVE:      /70   Pulse 72   Temp 98.1 °F (36.7 °C)   Wt 225 lb (102.1 kg)   LMP 05/07/2022 (Exact Date)   SpO2 97%   BMI 39.86 kg/m²     Physical Exam  Vitals reviewed. Constitutional:       General: She is not in acute distress. HENT:      Head: Normocephalic and atraumatic. Right Ear: A middle ear effusion is present. Tympanic membrane is erythematous. Nose:      Right Sinus: Maxillary sinus tenderness present. Left Sinus: Maxillary sinus tenderness present. Mouth/Throat:      Pharynx: Posterior oropharyngeal erythema present. Comments: Cobblestoning noted to posterior pharynx   Pulmonary:      Effort: Pulmonary effort is normal. No respiratory distress. Neurological:      General: No focal deficit present. Mental Status: She is alert and oriented to person, place, and time. Psychiatric:         Mood and Affect: Mood normal.       ASSESSMENT:   Diagnosis Orders   1. Acute bacterial sinusitis     2. Sore throat  POCT rapid strep A     PLAN:  1.  Acute bacterial sinusitis  - Push fluids  - Tylenol/Motrin    - OTC Claritin D  - Start Flonase- advised to take 2 sprays to each nostril daily while s/s are persisting, then may decrease to PRN as ordered  - Will call in an ATB  - Symptomatic therapy suggested: push fluids, rest, use acetaminophen, ibuprofen prn and return office visit prn if symptoms persist or worsen. - Call or return to clinic prn if these symptoms worsen or fail to improve as anticipated. 2. Sore throat  - Strep negative  - Tylenol, fluids, throat lozanges, and will call in ATB. - Salt water gargles BID  - Call if symptoms worsen or do not improve. - POCT rapid strep A    - Rest of systems unchanged, continue current treatments. - On this date May 9, 2022,  I have spent greater than 50% of this visit reviewing previous notes, test results and/or face to face with the patient discussing the diagnoses, importance of compliance with the treatment plan, counseling, coordinating care as well as documenting on the day of the visit.      Paulina Silver, APRN-CNP

## 2022-05-23 NOTE — PROGRESS NOTES
Alexys Mccann, APRN-CNP  [address] [facility]  [id number]  [address] [address]  Dept: [phone]  Dept Fax: [phone]     PATIENT ID: Eric Farrell is a 38 y.o. female. HPI:  Established pt here today for f/u on chronic medical problems; HTN, HLD, elevated triglycerides, hyperglycemia, hypothyroidism, vitamin d def, allergies, anxiety, depression, go over labs and/or diagnostic studies, and medication refills. She is also here for a BP and weight check while being on Adipex. She has lost 1 lb since her last visit for a cumulative total of 5 lbs. This will be her last prescription and she will then start her 6 month holiday. Pt denies any fever or chills. Pt today denies any HA, chest pain, or SOB. Pt denies any N/V/D/C or abdominal pain. Today, patient is doing well on current tx and voices no concerns. My previous office notes, labs and diagnostic studies were reviewed prior to and during encounter. The patient's past medical, surgical, social, and family history as well as current medications and allergies were reviewed as documented in today's encounter by KELVIN Felix [facility] Current Outpatient Medications on File Prior to Visit   Medication Sig Dispense Refill    fluticasone (FLONASE) 50 MCG/ACT nasal spray 1 spray by Nasal route daily 16 g 0    lisinopril-hydroCHLOROthiazide (PRINZIDE;ZESTORETIC) 20-12.5 MG per tablet Take 2 tablets by mouth daily 180 tablet 1    celecoxib (CELEBREX) 100 MG capsule TAKE 1 CAPSULE (100 MG TOTAL) BY MOUTH IN THE MORNING.  Red Yeast Rice 600 MG CAPS       Omega-3 Fatty Acids (FISH OIL OMEGA-3) 1000 MG CAPS       FLUoxetine (PROZAC) 20 MG capsule Take 1 capsule by mouth daily 90 capsule 1    VITAMIN D PO Take 10,000 Units by mouth daily       No current facility-administered medications on file prior to visit.      SUBJECTIVE:     Review of Systems   Constitutional: Negative for activity change, fatigue and fever. HENT: Negative for congestion, ear pain, rhinorrhea and sore throat. Respiratory: Negative for cough, chest tightness and shortness of breath. Cardiovascular: Negative for chest pain and palpitations. Gastrointestinal: Negative for abdominal distention, abdominal pain, constipation, diarrhea, nausea and vomiting. Endocrine: Negative for polydipsia, polyphagia and polyuria. Genitourinary: Negative for difficulty urinating and dysuria. Musculoskeletal: Negative for arthralgias, back pain and myalgias. Skin: Negative for rash. Allergic/Immunologic: Positive for environmental allergies (stable on Flonase). Neurological: Negative for dizziness, weakness, light-headedness and headaches. Hematological: Negative for adenopathy. Psychiatric/Behavioral: Positive for dysphoric mood (stable on Prozac 20 mg daily). Negative for agitation and behavioral problems. The patient is nervous/anxious (stable on Prozac 20 mg daily). OBJECTIVE:  /64   Pulse 69   Ht 5' 3\" (1.6 m)   Wt 224 lb 6.4 oz (101.8 kg)   LMP 05/07/2022 (Exact Date)   SpO2 99%   BMI 39.75 kg/m²     Physical Exam  Vitals and nursing note reviewed. Constitutional:       General: She is not in acute distress. Appearance: Normal appearance. She is well-developed. HENT:      Head: Normocephalic and atraumatic. Cardiovascular:      Rate and Rhythm: Normal rate and regular rhythm. Heart sounds: Normal heart sounds. No murmur heard. Pulmonary:      Effort: Pulmonary effort is normal. No respiratory distress. Breath sounds: Normal breath sounds. Chest:      Chest wall: No tenderness. Abdominal:      General: Bowel sounds are normal.      Palpations: Abdomen is soft. Tenderness: There is no abdominal tenderness. Musculoskeletal:         General: Normal range of motion. Cervical back: Normal range of motion. Right lower leg: No edema.       Left lower leg: No edema. Skin:     General: Skin is warm and dry. Findings: No rash. Neurological:      Mental Status: She is alert and oriented to person, place, and time. Psychiatric:         Mood and Affect: Mood normal.         Behavior: Behavior is cooperative. ASSESSMENT:   Diagnosis Orders   1. Essential hypertension  CBC    Comprehensive Metabolic Panel    Lipid Panel   2. Hyperlipidemia, unspecified hyperlipidemia type  CBC    Comprehensive Metabolic Panel    Lipid Panel   3. Hyperglycemia  Hemoglobin A1C   4. Hypothyroidism, unspecified type  TSH    T4, Free   5. Vitamin D deficiency     6. Weight gain  phentermine (ADIPEX-P) 37.5 MG tablet   7. BMI 40.0-44.9, adult (HCC)  phentermine (ADIPEX-P) 37.5 MG tablet   8. Anxiety and depression       PLAN:  1. Essential hypertension  - Stable: Medication re-filled as needed, con't medications as prescribed, con't current tx plan  - BP in office today noted; 116/64  - Continue Lisinopril-HCTZ 20-12.5 mg daily as previously prescribed. - Low sodium diet and routine exercise encouraged. - Advised to seek emergent tx if SBP>180 and/or DBP>110, any chest pain, h/a or vision changes. 2. Hyperlipidemia, unspecified hyperlipidemia type  - Stable: Medication re-filled as needed, con't medications as prescribed, con't current tx plan  - Will cont with low fat/chol diet, Red Yeast Rice & Fish Oil as ordered. 3. Hyperglycemia  - Most recent A1c 5.9  - Advised to decrease, fats, carbohydrates, and engage in a healthier diet overall  - Encouraged 40 minutes of aerobic exercise 3-4 times/week. 4. Hypothyroidism, unspecified type  - Stable: Medication re-filled as needed, con't medications as prescribed, con't current tx plan  - She is off all thyroid medication  - Will order repeat TSH and re-initiation of synthroid will be discussed if continued elevation  - Last TSH 7.75     5.  Vitamin D deficiency  - Stable: Medication re-filled as needed, con't medications as prescribed, con't current tx plan  - Continue Vitamin D supplements as ordered, will continue to follow Vitamin D level  - Please increase foods with Vitamin D, which include cheese, eggs, cereals, yogurt, milk, tofu, any type of beef, salmon or tuna,  spinach, and mushroom. 6. Weight gain  7. BMI 40.0-44.9, adult ([address]  - Doing well, as she has lost 1 lbs since her last visit  - She has been taking the Adipex as prescribed and tolerating it without significant side effects.   - BP and HR noted on today's visit. - Continue with Adipex 37.5 mg daily as previously prescribed. - This is month 3/3  - I also did stress the importance of exercise to aim for 150 minutes of aerobic activity a week and to watch her diet and calorie intake. - Will continue with monthly BP and weight checks. 8. Anxiety and depression  - Stable: Medication re-filled as needed, con't medications as prescribed, con't current tx plan  - Continue with Prozac 20 mg daily as previously prescribed. - Pt encouraged to deep breath and relax through anxious moments. - Offered reassurance and allowed to ventilate feelings and ask questions.   - Non-pharmological coping methods encouraged. - Rest of systems unchanged, continue current treatments. - On this date May 24, 2022,  I have spent greater than 50% of this visit reviewing previous notes, test results and/or face to face with the patient discussing the diagnoses, importance of compliance with the treatment plan, counseling, coordinating care as well as documenting on the day of the visit.      MARCOS Russ-CNP

## 2022-05-24 ENCOUNTER — OFFICE VISIT (OUTPATIENT)
Dept: FAMILY MEDICINE CLINIC | Age: 42
End: 2022-05-24
Payer: COMMERCIAL

## 2022-05-24 VITALS
OXYGEN SATURATION: 99 % | DIASTOLIC BLOOD PRESSURE: 64 MMHG | HEART RATE: 69 BPM | WEIGHT: 224.4 LBS | HEIGHT: 63 IN | BODY MASS INDEX: 39.76 KG/M2 | SYSTOLIC BLOOD PRESSURE: 116 MMHG

## 2022-05-24 DIAGNOSIS — E78.5 HYPERLIPIDEMIA, UNSPECIFIED HYPERLIPIDEMIA TYPE: ICD-10-CM

## 2022-05-24 DIAGNOSIS — E55.9 VITAMIN D DEFICIENCY: ICD-10-CM

## 2022-05-24 DIAGNOSIS — I10 ESSENTIAL HYPERTENSION: Primary | ICD-10-CM

## 2022-05-24 DIAGNOSIS — F41.9 ANXIETY AND DEPRESSION: ICD-10-CM

## 2022-05-24 DIAGNOSIS — F32.A ANXIETY AND DEPRESSION: ICD-10-CM

## 2022-05-24 DIAGNOSIS — R63.5 WEIGHT GAIN: ICD-10-CM

## 2022-05-24 DIAGNOSIS — R73.9 HYPERGLYCEMIA: ICD-10-CM

## 2022-05-24 DIAGNOSIS — E03.9 HYPOTHYROIDISM, UNSPECIFIED TYPE: ICD-10-CM

## 2022-05-24 PROCEDURE — 99214 OFFICE O/P EST MOD 30 MIN: CPT | Performed by: NURSE PRACTITIONER

## 2022-05-24 RX ORDER — PHENTERMINE HYDROCHLORIDE 37.5 MG/1
37.5 TABLET ORAL
Qty: 30 TABLET | Refills: 0 | Status: SHIPPED | OUTPATIENT
Start: 2022-05-24 | End: 2022-06-23

## 2022-05-24 ASSESSMENT — ENCOUNTER SYMPTOMS
NAUSEA: 0
RHINORRHEA: 0
SORE THROAT: 0
CHEST TIGHTNESS: 0
CONSTIPATION: 0
SHORTNESS OF BREATH: 0
BACK PAIN: 0
VOMITING: 0
COUGH: 0
DIARRHEA: 0
ABDOMINAL DISTENTION: 0
ABDOMINAL PAIN: 0

## 2022-05-24 NOTE — PROGRESS NOTES
Visit Information    Have you changed or started any medications since your last visit including any over-the-counter medicines, vitamins, or herbal medicines? no   Are you having any side effects from any of your medications? -  no  Have you stopped taking any of your medications? Is so, why? -  no    Have you seen any other physician or provider since your last visit? No  Have you had any other diagnostic tests since your last visit? Yes - Records Obtained  Have you been seen in the emergency room and/or had an admission to a hospital since we last saw you? No  Have you had your routine dental cleaning in the past 6 months? no    Have you activated your [employer] account? If not, what are your barriers?  Yes     Patient Care Team:  MARCOS Pérez NP as PCP - General (Nurse Practitioner)  MARCOS Pérez NP as PCP - [facility] Provider    Medical History Review  Past Medical, Family, and Social History reviewed and does contribute to the patient presenting condition    Health Maintenance   Topic Date Due    Cervical cancer screen  Never done    Pneumococcal 0-64 years Vaccine (2 - PCV) 09/13/2013    COVID-19 Vaccine (1) 05/24/2022 (Originally [birth year]1987)    DTaP/Tdap/Td vaccine (1 - Tdap) 02/24/2023 (Originally 2/9/2001)    Flu vaccine (Season Ended) 02/24/2023 (Originally 9/1/2022)    Hepatitis C screen  02/24/2023 (Originally 2/9/2000)    HIV screen  02/24/2023 (Originally 2/9/1997)    A1C test (Diabetic or Prediabetic)  02/24/2023    Depression Monitoring  03/25/2023    Lipids  02/24/2027    Hepatitis A vaccine  Aged Out    Hepatitis B vaccine  Aged Out    Hib vaccine  Aged Out    Meningococcal (ACWY) vaccine  Aged Out    Varicella vaccine  Discontinued

## 2022-06-22 ENCOUNTER — HOSPITAL ENCOUNTER (OUTPATIENT)
Age: 42
Setting detail: SPECIMEN
Discharge: HOME | End: 2022-06-22

## 2022-06-22 DIAGNOSIS — E03.9 HYPOTHYROIDISM, UNSPECIFIED TYPE: ICD-10-CM

## 2022-06-22 DIAGNOSIS — E78.5 HYPERLIPIDEMIA, UNSPECIFIED HYPERLIPIDEMIA TYPE: ICD-10-CM

## 2022-06-22 DIAGNOSIS — R73.9 HYPERGLYCEMIA: ICD-10-CM

## 2022-06-22 DIAGNOSIS — I10 ESSENTIAL HYPERTENSION: ICD-10-CM

## 2022-06-22 LAB
ESTIMATED AVERAGE GLUCOSE: 114 MG/DL
HBA1C MFR BLD: 5.6 % (ref 4–6)
HCT VFR BLD CALC: 41.9 % (ref 36.3–47.1)
HEMOGLOBIN: 14.1 G/DL (ref 11.9–15.1)
MCH RBC QN AUTO: 29.8 PG (ref 25.2–33.5)
MCHC RBC AUTO-ENTMCNC: 33.7 G/DL (ref 28.4–34.8)
MCV RBC AUTO: 88.6 FL (ref 82.6–102.9)
NRBC AUTOMATED: 0 PER 100 WBC
PDW BLD-RTO: 14.6 % (ref 11.8–14.4)
PLATELET # BLD: 287 K/UL (ref 138–453)
PMV BLD AUTO: 11.5 FL (ref 8.1–13.5)
RBC # BLD: 4.73 M/UL (ref 3.95–5.11)
WBC # BLD: 9.1 K/UL (ref 3.5–11.3)

## 2022-06-23 LAB
ALBUMIN SERPL-MCNC: 4.1 G/DL (ref 3.5–5.2)
ALBUMIN/GLOBULIN RATIO: 1.6 (ref 1–2.5)
ALP BLD-CCNC: 44 U/L (ref 35–104)
ALT SERPL-CCNC: 13 U/L (ref 5–33)
ANION GAP SERPL CALCULATED.3IONS-SCNC: 13 MMOL/L (ref 9–17)
AST SERPL-CCNC: 17 U/L
BILIRUB SERPL-MCNC: 0.44 MG/DL (ref 0.3–1.2)
BUN BLDV-MCNC: 12 MG/DL (ref 6–20)
CALCIUM SERPL-MCNC: 9.3 MG/DL (ref 8.6–10.4)
CHLORIDE BLD-SCNC: 101 MMOL/L (ref 98–107)
CHOLESTEROL/HDL RATIO: 4.2
CHOLESTEROL: 173 MG/DL
CO2: 23 MMOL/L (ref 20–31)
CREAT SERPL-MCNC: 0.55 MG/DL (ref 0.5–0.9)
GFR AFRICAN AMERICAN: >60 ML/MIN
GFR NON-AFRICAN AMERICAN: >60 ML/MIN
GFR SERPL CREATININE-BSD FRML MDRD: NORMAL ML/MIN/{1.73_M2}
GLUCOSE BLD-MCNC: 87 MG/DL (ref 70–99)
HDLC SERPL-MCNC: 41 MG/DL
LDL CHOLESTEROL: 95 MG/DL (ref 0–130)
POTASSIUM SERPL-SCNC: 4.1 MMOL/L (ref 3.7–5.3)
SODIUM BLD-SCNC: 137 MMOL/L (ref 135–144)
THYROXINE, FREE: 0.8 NG/DL (ref 0.93–1.7)
TOTAL PROTEIN: 6.6 G/DL (ref 6.4–8.3)
TRIGL SERPL-MCNC: 184 MG/DL
TSH SERPL DL<=0.05 MIU/L-ACNC: 6.32 UIU/ML (ref 0.3–5)

## 2022-06-23 ASSESSMENT — ENCOUNTER SYMPTOMS
CONSTIPATION: 0
SORE THROAT: 0
ABDOMINAL DISTENTION: 0
NAUSEA: 0
SHORTNESS OF BREATH: 0
RHINORRHEA: 0
VOMITING: 0
DIARRHEA: 0
COUGH: 0
BACK PAIN: 0
CHEST TIGHTNESS: 0
ABDOMINAL PAIN: 0

## 2022-06-23 NOTE — PROGRESS NOTES
SUBJECTIVE:     Review of Systems   Constitutional: Negative for activity change, fatigue and fever. HENT: Negative for congestion, ear pain, rhinorrhea and sore throat. Respiratory: Negative for cough, chest tightness and shortness of breath. Cardiovascular: Negative for chest pain and palpitations. Gastrointestinal: Negative for abdominal distention, abdominal pain, constipation, diarrhea, nausea and vomiting. Endocrine: Negative for polydipsia, polyphagia and polyuria. Genitourinary: Negative for difficulty urinating and dysuria. Musculoskeletal: Positive for arthralgias (stable on Celebrex 200 mg daily). Negative for back pain and myalgias. Skin: Negative for rash. Neurological: Negative for dizziness, weakness, light-headedness and headaches. Hematological: Negative for adenopathy. Psychiatric/Behavioral: Positive for dysphoric mood (not well controlled on Prozac 20 mg). Negative for agitation and behavioral problems. The patient is not nervous/anxious. OBJECTIVE:  /70   Pulse 75   Temp 98.1 °F (36.7 °C)   Wt 228 lb (103.4 kg)   LMP 06/01/2022 (Exact Date)   SpO2 97%   BMI 40.39 kg/m²     Physical Exam  Vitals and nursing note reviewed. Constitutional:       General: She is not in acute distress. Appearance: Normal appearance. She is well-developed. HENT:      Head: Normocephalic and atraumatic. Cardiovascular:      Rate and Rhythm: Normal rate and regular rhythm. Heart sounds: Normal heart sounds. No murmur heard. Pulmonary:      Effort: Pulmonary effort is normal. No respiratory distress. Breath sounds: Normal breath sounds. Chest:      Chest wall: No tenderness. Abdominal:      General: Bowel sounds are normal.      Palpations: Abdomen is soft. Tenderness: There is no abdominal tenderness. Musculoskeletal:         General: Normal range of motion. Cervical back: Normal range of motion. Right lower leg: No edema. Left lower leg: No edema. Skin:     General: Skin is warm and dry. Findings: No rash. Neurological:      Mental Status: She is alert and oriented to person, place, and time. Psychiatric:         Mood and Affect: Mood normal.         Behavior: Behavior is cooperative. ASSESSMENT:   Diagnosis Orders   1. Essential hypertension  CBC    Comprehensive Metabolic Panel    Lipid Panel   2. Hyperlipidemia, unspecified hyperlipidemia type  CBC    Comprehensive Metabolic Panel    Lipid Panel   3. Vitamin D deficiency  Vitamin D 25 Hydroxy   4. Hypothyroidism, unspecified type  TSH    T4, Free   5. Weight gain     6. BMI 40.0-44.9, adult ([address]     7. Anxiety and depression     8. Hyperglycemia  Hemoglobin A1C     PLAN:  1. Essential hypertension  - Stable: Medication re-filled as needed, con't medications as prescribed, con't current tx plan  - BP in office today noted; 110/70   - Continue Lisinopril-HCTZ 20-12.5 mg daily as previously prescribed. 2. Hyperlipidemia, unspecified hyperlipidemia type  - Stable: Medication re-filled as needed, con't medications as prescribed, con't current tx plan  - Will cont with low fat/chol diet, Red Yeast & Fish Oil as ordered. 3. Vitamin D deficiency  - Stable: Medication re-filled as needed, con't medications as prescribed, con't current tx plan  - Continue Vitamin D supplements as ordered, will continue to follow Vitamin D level  - Please increase foods with Vitamin D, which include cheese, eggs, cereals, yogurt, milk, tofu, any type of beef, salmon or tuna,  spinach, and mushroom. 4. Hypothyroidism, unspecified type  - Stable: Medication re-filled as needed, con't medications as prescribed, con't current tx plan  - Last TSH 6.32 with T4 0.80  - Both improved from last set of labs  - Will hold off on medication as she is without symtoms. 5. Weight gain  6. BMI 40.0-44.9, adult ([address]  - Doing well, as she has lost 1 lbs since starting Adipex.   - She has been taking the Adipex as prescribed and tolerating it without significant side effects.   - BP and HR noted on today's visit. - This is completion of month 3/3. - Will begin 6 month holiday and re-evaluate in December. - I also did stress the importance of continued exercise to aim for 150 minutes of aerobic activity a week and to watch her diet and calorie intake. 7. Anxiety and depression  - Not well controlled on Prozac 20 mg daily  - Will increase Prozac to 40 mg daily  - Pt encouraged to deep breath and relax through anxious moments   - Offered reassurance and allowed to ventilate feelings and ask questions.   - Non-pharmological coping methods discussed. - Rest of systems unchanged, continue current treatments. - On this date June 24, 2022,  I have spent greater than 50% of this visit reviewing previous notes, test results and/or face to face with the patient discussing the diagnoses, importance of compliance with the treatment plan, counseling, coordinating care as well as documenting on the day of the visit.      Bedelia Dubin, APRN-CNP

## 2022-06-24 ENCOUNTER — OFFICE VISIT (OUTPATIENT)
Dept: FAMILY MEDICINE CLINIC | Age: 42
End: 2022-06-24
Payer: COMMERCIAL

## 2022-06-24 VITALS
HEART RATE: 75 BPM | OXYGEN SATURATION: 97 % | DIASTOLIC BLOOD PRESSURE: 70 MMHG | TEMPERATURE: 98.1 F | WEIGHT: 228 LBS | SYSTOLIC BLOOD PRESSURE: 110 MMHG | BODY MASS INDEX: 40.39 KG/M2

## 2022-06-24 DIAGNOSIS — R63.5 WEIGHT GAIN: ICD-10-CM

## 2022-06-24 DIAGNOSIS — E55.9 VITAMIN D DEFICIENCY: ICD-10-CM

## 2022-06-24 DIAGNOSIS — E03.9 HYPOTHYROIDISM, UNSPECIFIED TYPE: ICD-10-CM

## 2022-06-24 DIAGNOSIS — F41.9 ANXIETY AND DEPRESSION: ICD-10-CM

## 2022-06-24 DIAGNOSIS — I10 ESSENTIAL HYPERTENSION: Primary | ICD-10-CM

## 2022-06-24 DIAGNOSIS — R73.9 HYPERGLYCEMIA: ICD-10-CM

## 2022-06-24 DIAGNOSIS — F32.A ANXIETY AND DEPRESSION: ICD-10-CM

## 2022-06-24 DIAGNOSIS — E78.5 HYPERLIPIDEMIA, UNSPECIFIED HYPERLIPIDEMIA TYPE: ICD-10-CM

## 2022-06-24 PROCEDURE — 99214 OFFICE O/P EST MOD 30 MIN: CPT | Performed by: NURSE PRACTITIONER

## 2022-06-24 RX ORDER — FLUOXETINE HYDROCHLORIDE 40 MG/1
40 CAPSULE ORAL DAILY
Qty: 90 CAPSULE | Refills: 1 | Status: SHIPPED | OUTPATIENT
Start: 2022-06-24 | End: 2022-10-11

## 2022-08-02 ASSESSMENT — SOCIAL DETERMINANTS OF HEALTH (SDOH)

## 2022-08-03 ENCOUNTER — OFFICE VISIT (OUTPATIENT)
Dept: OBGYN CLINIC | Age: 42
End: 2022-08-03
Payer: COMMERCIAL

## 2022-08-03 ENCOUNTER — HOSPITAL ENCOUNTER (OUTPATIENT)
Age: 42
Setting detail: SPECIMEN
Discharge: HOME | End: 2022-08-03

## 2022-08-03 VITALS
SYSTOLIC BLOOD PRESSURE: 118 MMHG | BODY MASS INDEX: 41.04 KG/M2 | WEIGHT: 223 LBS | HEIGHT: 62 IN | DIASTOLIC BLOOD PRESSURE: 70 MMHG

## 2022-08-03 DIAGNOSIS — N92.0 MENORRHAGIA WITH REGULAR CYCLE: ICD-10-CM

## 2022-08-03 DIAGNOSIS — Z01.419 ENCOUNTER FOR ANNUAL ROUTINE GYNECOLOGICAL EXAMINATION: Primary | ICD-10-CM

## 2022-08-03 DIAGNOSIS — Z12.31 VISIT FOR SCREENING MAMMOGRAM: ICD-10-CM

## 2022-08-03 PROCEDURE — 99386 PREV VISIT NEW AGE 40-64: CPT | Performed by: NURSE PRACTITIONER

## 2022-08-03 ASSESSMENT — ENCOUNTER SYMPTOMS
COUGH: 0
SHORTNESS OF BREATH: 0
ABDOMINAL PAIN: 0
NAUSEA: 0
VOMITING: 0
COLOR CHANGE: 0

## 2022-08-03 NOTE — PROGRESS NOTES
Arlette Murrell is a 38 y.o.  here to establish care and  for her annual exam.  The patient was seen and examined. The patients past medical, surgical, social and family history were reviewed. Current medications and allergies were reviewed, and documented in the chart. She is  with 2 children. Tobacco abuse No    Last PAP: over 5 years ago, hx of abnormal PAP no  Family hx uterine or ovarian cancer-denies  Last mammogram if indicated-has not had, Family hx of breast cancer -denies  family hx colon cancer -denies        Sexually active: yes,  Dyspareunia: No, Vaginal discharge: no,  UTI symptoms: no, voiding difficulties: no, bowels regular:Yes bloating:no      Menstrual history:  Menarche age- 17,   Birth control: vasectomy  LMP: 24    Pt has additional complaint of heavy periods. She states periods are regular every month and last 5 days but heavy not significant clotting but has to change tampon/pad every couple hours especially first few days. Denies significnat pelvic pain or BTB. She states periods have somewhat been like this since delivery last child in . Admits moodiness prior to menses also. She denies new medications/supplemenst, significant weight changes, significant stressors, fatigue, hairsutism palpitations, hair loss, galactorrhea,  easy bruising/bleeding. Denies pelvic pain, vaginal d/c odor or itching. Denies concern STI.       OB History    Para Term  AB Living   2         2   SAB IAB Ectopic Molar Multiple Live Births                    # Outcome Date GA Lbr Baljit/2nd Weight Sex Delivery Anes PTL Lv   2      F CS-LTranv      1      M CS-LTranv          Vitals:    22 0902   BP: 118/70   Site: Right Upper Arm   Position: Sitting   Cuff Size: Large Adult   Weight: 223 lb (101.2 kg)   Height: 5' 2\" (1.575 m)       Wt Readings from Last 3 Encounters:   22 223 lb (101.2 kg)   22 228 lb (103.4 kg)   22 224 lb 6.4 oz (101.8 kg) Past Medical History:   Diagnosis Date    Anxiety     Depression     Hypertension                                                                    Past Surgical History:   Procedure Laterality Date     SECTION      x2     WISDOM TOOTH EXTRACTION       Family History   Problem Relation Age of Onset    Other Mother         thyroid problems     Social History     Tobacco Use   Smoking Status Former    Packs/day: 0.50    Types: Cigarettes    Quit date: 6/10/2022    Years since quittin.1   Smokeless Tobacco Never     Social History     Substance and Sexual Activity   Alcohol Use Not Currently    Comment: Rarely        Social History       Tobacco History       Smoking Status  Former Quit Date  6/10/2022 Smoking Frequency  0.50 packs/day Smoking Tobacco Type  Cigarettes quit in 6/10/2022      Smokeless Tobacco Use  Never              Alcohol History       Alcohol Use Status  Not Currently Comment  Rarely              Drug Use       Drug Use Status  Never              Sexual Activity       Sexually Active  Not Asked                  No Known Allergies  Current Outpatient Medications   Medication Sig Dispense Refill    FLUoxetine (PROZAC) 40 MG capsule Take 1 capsule by mouth daily 90 capsule 1    fluticasone (FLONASE) 50 MCG/ACT nasal spray 1 spray by Nasal route daily 16 g 0    lisinopril-hydroCHLOROthiazide (PRINZIDE;ZESTORETIC) 20-12.5 MG per tablet Take 2 tablets by mouth daily 180 tablet 1    Red Yeast Rice 600 MG CAPS       Omega-3 Fatty Acids (FISH OIL OMEGA-3) 1000 MG CAPS       VITAMIN D PO Take 10,000 Units by mouth daily       No current facility-administered medications for this visit. Subjective:     Review of Systems   Constitutional:  Negative for chills and fever. Respiratory:  Negative for cough and shortness of breath. Cardiovascular:  Negative for chest pain, palpitations and leg swelling. Gastrointestinal:  Negative for abdominal pain, nausea and vomiting. Genitourinary:  Positive for menstrual problem. Negative for dyspareunia, dysuria, flank pain, pelvic pain, vaginal bleeding, vaginal discharge and vaginal pain. Skin:  Negative for color change and rash. Neurological:  Negative for dizziness, light-headedness and headaches. Hematological:  Negative for adenopathy. Does not bruise/bleed easily. Psychiatric/Behavioral:  Negative for self-injury and suicidal ideas. Objective:     Physical Exam  Vitals and nursing note reviewed. Constitutional:       General: She is not in acute distress. Appearance: She is well-developed. She is not diaphoretic. HENT:      Head: Normocephalic and atraumatic. Right Ear: External ear normal.      Left Ear: External ear normal.      Nose: Nose normal.   Eyes:      Pupils: Pupils are equal, round, and reactive to light. Neck:      Thyroid: No thyromegaly. Cardiovascular:      Rate and Rhythm: Normal rate and regular rhythm. Heart sounds: Normal heart sounds. No murmur heard. No friction rub. No gallop. Comments: No bilateral calf tenderness or swelling  Pulmonary:      Effort: Pulmonary effort is normal. No respiratory distress. Breath sounds: Normal breath sounds. No wheezing. Abdominal:      General: Bowel sounds are normal.      Palpations: Abdomen is soft. Tenderness: There is no abdominal tenderness. Genitourinary:     Comments: Breasts nipples everted, no masses or tenderness, does BSE  Vulva-no lesions  Vagina-pink rugated  Cervix-firm, 2 cm. Nontender, freely movable, no lesions  Uterus-ant. Smooth, firm, nontender, freely movable  Adnexa-no masses or tenderness   Musculoskeletal:         General: Normal range of motion. Cervical back: Normal range of motion and neck supple. Lymphadenopathy:      Cervical: No cervical adenopathy. Skin:     General: Skin is warm and dry. Findings: No rash.    Neurological:      Mental Status: She is alert and oriented to person, place, and time. Cranial Nerves: No cranial nerve deficit. Deep Tendon Reflexes: Reflexes are normal and symmetric. Psychiatric:         Behavior: Behavior normal.         Thought Content: Thought content normal.         Judgment: Judgment normal.     /70 (Site: Right Upper Arm, Position: Sitting, Cuff Size: Large Adult)   Ht 5' 2\" (1.575 m)   Wt 223 lb (101.2 kg)   LMP 07/22/2022   BMI 40.79 kg/m²     Assessment:       Diagnosis Orders   1. Encounter for annual routine gynecological examination  PAP SMEAR      2. Visit for screening mammogram  AMARIS DIGITAL SCREEN W OR WO CAD BILATERAL      3. Menorrhagia with regular cycle  US PELVIS COMPLETE    US NON OB TRANSVAGINAL          Breast exam completed. Pelvic exam pap smear collected and sent. Cultures sent No    Plan:   Collect pap   BSE reviewed, Mammogram ordered: yes  STD prevention reviewed  Gardisil counseling completed for all patients 10-37 yo    Heavy periods:  Cultures declined   She has had recent CBC, CMP, HGB A1cand TSh by PCP in JUne 2022. TSh was elevated 6.32 but states pcp is monitoring it   [facility]  Yes vasectomy  DVT/ACHEs signs reviewed with patient. Refill medication if appropriate  Diet & Exercise reviewed with pt. Preventive  Health through PCP   RV after pelvic US to discuss tx of otions for HMB          Orders Placed This Encounter   Procedures    AMARIS DIGITAL SCREEN W OR WO CAD BILATERAL     Standing Status:   Future     Standing Expiration Date:   10/3/2023     Order Specific Question:   Reason for exam:     Answer:   SCREENING-PREVENTATIVE    US PELVIS COMPLETE     This procedure can be scheduled via [facility]. Access your [facility] account by visiting [email or web address]. Standing Status:   Future     Standing Expiration Date:   8/3/2023    US NON OB TRANSVAGINAL     Begin with transabdominal imaging.      Standing Status:   Future     Standing Expiration Date:   8/3/2023     Order Specific Question:   Reason for exam:     Answer:   abnormal bleeding    PAP SMEAR     Patient History:    Patient's last menstrual period was 2022. OBGYN Status: Having periods  Past Surgical History:  No date:  SECTION      Comment:  x2   No date: WISDOM TOOTH EXTRACTION      Social History    Tobacco Use      Smoking status: Former        Packs/day: 0.50        Types: Cigarettes        Quit date: 6/10/2022        Years since quittin.1      Smokeless tobacco: Never       Standing Status:   Future     Standing Expiration Date:   2023     Order Specific Question:   Collection Type     Answer: Thin Prep     Order Specific Question:   Prior Abnormal Pap Test     Answer:   No     Order Specific Question:   Screening or Diagnostic     Answer:   Screening     Order Specific Question:   HPV Requested? Answer:   Yes     Order Specific Question:   High Risk Patient     Answer:   N/A     No orders of the defined types were placed in this encounter. Patient given educational materials - seepatient instructions. Discussed use, benefit, and side effects of prescribed medications. All patient questions answered. Pt voiced understanding. Reviewed health maintenance. Instructed to continue current medications, diet and exercise. Patient agreedwith treatment plan. Follow up as directed.       Electronically signed by MARCOS Alvarez CNP on 8/3/2022at 9:46 AM

## 2022-08-05 LAB
HPV SAMPLE: NORMAL
HPV, GENOTYPE 16: NOT DETECTED
HPV, GENOTYPE 18: NOT DETECTED
HPV, HIGH RISK OTHER: NOT DETECTED
HPV, INTERPRETATION: NORMAL
SPECIMEN DESCRIPTION: NORMAL

## 2022-08-10 LAB — CYTOLOGY REPORT: NORMAL

## 2022-08-12 RX ORDER — FLUCONAZOLE 150 MG/1
150 TABLET ORAL ONCE
Qty: 1 TABLET | Refills: 0 | Status: SHIPPED | OUTPATIENT
Start: 2022-08-12 | End: 2022-08-12

## 2022-08-19 ENCOUNTER — HOSPITAL ENCOUNTER (OUTPATIENT)
Dept: MAMMOGRAPHY | Age: 42
Discharge: HOME | End: 2022-08-21
Payer: COMMERCIAL

## 2022-08-19 VITALS — BODY MASS INDEX: 41.04 KG/M2 | HEIGHT: 62 IN | WEIGHT: 223 LBS

## 2022-08-19 DIAGNOSIS — Z12.31 VISIT FOR SCREENING MAMMOGRAM: ICD-10-CM

## 2022-08-19 PROCEDURE — 77063 BREAST TOMOSYNTHESIS BI: CPT

## 2022-08-29 ENCOUNTER — HOSPITAL ENCOUNTER (OUTPATIENT)
Dept: WOMENS IMAGING | Age: 42
Discharge: HOME | End: 2022-08-31
Payer: COMMERCIAL

## 2022-08-29 DIAGNOSIS — R92.8 ABNORMAL MAMMOGRAM OF LEFT BREAST: ICD-10-CM

## 2022-08-29 PROCEDURE — 76642 ULTRASOUND BREAST LIMITED: CPT

## 2022-08-29 PROCEDURE — G0279 TOMOSYNTHESIS, MAMMO: HCPCS

## 2022-09-01 ENCOUNTER — TELEPHONE (OUTPATIENT)
Dept: OBGYN CLINIC | Age: 42
End: 2022-09-01

## 2022-09-01 DIAGNOSIS — R92.8 ABNORMAL MAMMOGRAM OF LEFT BREAST: Primary | ICD-10-CM

## 2022-09-01 NOTE — TELEPHONE ENCOUNTER
----- Message from MARCOS Ma CNP sent at 8/31/2022  6:05 PM EDT -----  Please let her know diagnostic imaging showed small group of closely adjacent cystic structures in left breast and recommend repeat US in 6 months to monitor stability please place order thank you

## 2022-09-17 ENCOUNTER — PATIENT MESSAGE (OUTPATIENT)
Dept: FAMILY MEDICINE CLINIC | Age: 42
End: 2022-09-17

## 2022-09-19 ENCOUNTER — TELEPHONE (OUTPATIENT)
Dept: OBGYN CLINIC | Age: 42
End: 2022-09-19

## 2022-09-19 ENCOUNTER — OFFICE VISIT (OUTPATIENT)
Dept: OBGYN CLINIC | Age: 42
End: 2022-09-19
Payer: COMMERCIAL

## 2022-09-19 VITALS
WEIGHT: 219 LBS | BODY MASS INDEX: 40.3 KG/M2 | SYSTOLIC BLOOD PRESSURE: 118 MMHG | DIASTOLIC BLOOD PRESSURE: 60 MMHG | HEIGHT: 62 IN

## 2022-09-19 DIAGNOSIS — N92.0 MENORRHAGIA WITH REGULAR CYCLE: Primary | ICD-10-CM

## 2022-09-19 DIAGNOSIS — N83.201 CYST OF RIGHT OVARY: ICD-10-CM

## 2022-09-19 DIAGNOSIS — R92.8 ABNORMAL MAMMOGRAM OF LEFT BREAST: ICD-10-CM

## 2022-09-19 DIAGNOSIS — R10.2 PELVIC PAIN: ICD-10-CM

## 2022-09-19 PROCEDURE — 99214 OFFICE O/P EST MOD 30 MIN: CPT | Performed by: NURSE PRACTITIONER

## 2022-09-19 RX ORDER — MELOXICAM 15 MG/1
TABLET ORAL
Qty: 90 TABLET | Refills: 1 | Status: SHIPPED | OUTPATIENT
Start: 2022-09-19

## 2022-09-19 RX ORDER — NORETHINDRONE ACETATE AND ETHINYL ESTRADIOL 1MG-20(21)
1 KIT ORAL DAILY
Qty: 3 PACKET | Refills: 0 | Status: SHIPPED | OUTPATIENT
Start: 2022-09-19

## 2022-09-19 RX ORDER — NORETHINDRONE ACETATE AND ETHINYL ESTRADIOL 1MG-20(21)
1 KIT ORAL DAILY
Qty: 1 PACKET | Refills: 3 | Status: SHIPPED | OUTPATIENT
Start: 2022-09-19 | End: 2022-09-19 | Stop reason: CLARIF

## 2022-09-19 NOTE — TELEPHONE ENCOUNTER
Can we do BC for 3 months at a time instead of 1 month? She called and left a message, pending meds.  I will cancel the other script sent over

## 2022-09-19 NOTE — TELEPHONE ENCOUNTER
From: Ye Marie  To: Clay Callejas  Sent: 9/17/2022 11:22 AM EDT  Subject: Meloxicam    I need refills for my Meloxicam please.

## 2022-09-19 NOTE — PROGRESS NOTES
Subjective:  Elian Churchill is in for follow up in regards to heavy menstrual periods. Elian Churchill established care for annual exam on 8/3/22 and had additional complaint of HMB. She stated periods are regular every month and last 5 days but heavy not significant clotting but has to change tampon/pad every couple hours especially first few days. Denied significnat pelvic pain or BTB. She stated periods have somewhat been like this since delivery last child in 2007. Admits moodiness prior to menses also. She denies new medications/supplements, significant weight changes, significant stressors, fatigue, hirsutism,  palpitations, hair loss, galactorrhea,  easy bruising/bleeding. Denies pelvic pain, vaginal d/c odor or itching. Denied concern STI. She is sexually active with - he has had vasectomy.  sudhir already had labs including CBC, CMP, HGB A1c and TSH per her PCP in June 2022- her TSH was elevated at that time was 6.32 but pcp was managing this. Pelvic US was ordered at her PAP on 8/3/22 and completed, she is here for follow up and discuss treatment/management. She was found to be + candida on pap and tx with diflucan. She was noted to have right ovarian cyst on pelvic US- she states has began to realize she does note lower abdominal pelvic pain especially on right side and worse with position changes at times. She states can note this couple times a month but states not new this has been for years even possibly since birth of her son 13 years ago. Exam Date: 8/18/2022       Dx: Abnormal Uterine Bleeding, HMB   Exam limited due to anatomy       Uterus: 10.8x5. 4x3.9cm, anteverted   Endometrial Stripe: 2.4mm   Right Ovary: simple appearing cyst measuring 2.2x2.5x2.1cm   Left Ovary: not visualized   No free fluid in pelvis     She also had screening mammogram since her last OV and showed abnormality on left breast she then completed diagnostic mammogram and left breast US which revealed:   Minimal asymmetry upper-outer quadrant left breast, almost all of which   appears to be due to summation artifact. Suggestion of a small group of closely adjacent cystic structures at 11:30   and 7 cm from the nipple for which six-month follow-up is recommended to   confirm stability/resolution. Dr. Hortencia Kerns personally spoke with this patient at the conclusion of her   studies today. BIRADS:   BIRADS - CATEGORY 3       Probably Benign Findings. A short interval follow-up targeted left breast   ultrasound is recommended in 6 months. OVERALL ASSESSMENT - PROBABLY BENIGN. A letter of notification will be sent to the patient regarding the              She reports there is a personal history or family history of:    Smoking (> 15 cigs/day): no    Migraine with Aura: no    HTN (> 160/100):  no, on antihypertensive HTN- well controlled. DVT:  no    Thrombophilias:  no    Stroke (CVA): no    Ischemic heart disease:  no    Valvular heart disease (A Fib, Pul HTN, etc): no    Positive Antiphospholipid Abs:  no    Liver Disease:  no  Hx of STI denies       Review of systems per HPI, otherwise negative. Allergies; medications; past medical, surgical, family, and social history; and problem list reviewed as indicated in this encounter. Objective:  Vitals:  Blood pressure 118/60, height 5' 2\" (1.575 m), weight 219 lb (99.3 kg), last menstrual period 09/11/2022, not currently breastfeeding. Constitutional: She is oriented to person, place, and time. She appears well-developed and well-nourished and in no acute distress. Cardiovascular: Normal rate and regular rhythm, no murmur, rub, or gallop    Pulmonary/Chest: Effort normal and breath sounds normal. No rales or wheezes. No chest retraction.   Abdomen: soft non tender bowel sounds active  Extremities: no clubbing, cyanosis, or edema  Neurological:  CN II - XII grossly intact; no focal neurological deficits  Psychiatric:  Well groomed, well dressed. The patient maintains appropriate eye contact and does not appear to be responding to internal stimuli. No agitation    Assessment/ Plan / Medical Decision Making   Diagnosis Orders   1. Menorrhagia with regular cycle  US PELVIS COMPLETE    US NON OB TRANSVAGINAL    DISCONTINUED: norethindrone-ethinyl estradiol (LOESTRIN FE 1/20) 1-20 MG-MCG per tablet      2. Cyst of right ovary  US PELVIS COMPLETE    US NON OB TRANSVAGINAL    DISCONTINUED: norethindrone-ethinyl estradiol (LOESTRIN FE 1/20) 1-20 MG-MCG per tablet      3. Pelvic pain        4. Abnormal mammogram of left breast                Medications, laboratory testing, imaging, consultation, and follow up as documented in this encounter. Heavy periods-   Reviewed pelvic US and results with pt. Discussed need for endometrial biopsy given age >35 with HMB. Discussed risk hyperplasia. Recommend EMB. Discussed possible management of AUB discussed with patient. Pros and cons of medical management with OCPs, Depo-Provera injections or cyclic progestogens, IUD vs EMB, surgical management with endometrial ablation, hysteroscopy D &C,  or hysterectomy. She is interested in starting OCP- [facility] for pelvic pain/ovarian cysts and for HMB. Pt.counseled on  oral contraceptives. Start pills on menses. Please take pills the same time every day. If you miss a pill or take it later, you may see breakthrough bleeding. This is not harmful, but can be annoying. Oral contraceptives prevent pregnancy, but do not protect against STD's,PLEASE USE CONDOMS. It is also recommended that you use condoms, while on antibiotics. DVT signs reviewed with patient. ( Chest Pain, SOB, Headaches with visual changes or numbness, calf tenderness). Discussed increased risks given age >43. Monitor BP. Pelvic pain/ovarian cyst-   Start ocp, repeat US in 3 months. Torsion precautions discussed.      Monitor severe pain especially ones sided, heavy bleeding >1 tampon/pad hr, fevers/chills notify us, go to ER. Return EMB at earliest convenience and then 3 months for med check and repeat pelvic US. Lauren received counseling on the following healthy behaviors: medication adherence    Patient given educational materials on ovarian cyst, menorrhagia and EMB    Was a self-tracking handout given in paper form or via MyChart? No  If yes, see orders or list here. Discussed use, benefit, and side effects of prescribed medications. Barriers to medication compliance addressed. All patient questions answered. Pt voiced understanding. This note is created with the assistance of a speech-recognition program.  While intending to generate a document that actually reflects the content of the visit, no guarantees can be provided that every mistake has been identified and corrected by editing. Of the 30 minute duration appointment visit, Magnolia Rodriguez CNP spent at least 50% of the face-to-face time in counseling, explanation of diagnosis, planning of further management, and answering all questions.

## 2022-10-11 ENCOUNTER — TELEMEDICINE (OUTPATIENT)
Dept: FAMILY MEDICINE CLINIC | Age: 42
End: 2022-10-11
Payer: COMMERCIAL

## 2022-10-11 DIAGNOSIS — B96.89 ACUTE BACTERIAL SINUSITIS: Primary | ICD-10-CM

## 2022-10-11 DIAGNOSIS — J40 BRONCHITIS: ICD-10-CM

## 2022-10-11 DIAGNOSIS — J01.90 ACUTE BACTERIAL SINUSITIS: Primary | ICD-10-CM

## 2022-10-11 PROCEDURE — 99213 OFFICE O/P EST LOW 20 MIN: CPT | Performed by: NURSE PRACTITIONER

## 2022-10-11 RX ORDER — AMOXICILLIN AND CLAVULANATE POTASSIUM 875; 125 MG/1; MG/1
1 TABLET, FILM COATED ORAL 2 TIMES DAILY
Qty: 20 TABLET | Refills: 0 | Status: SHIPPED | OUTPATIENT
Start: 2022-10-11 | End: 2022-10-21

## 2022-10-11 ASSESSMENT — ENCOUNTER SYMPTOMS
BACK PAIN: 0
ABDOMINAL DISTENTION: 0
CHEST TIGHTNESS: 0
SINUS PAIN: 1
ABDOMINAL PAIN: 0
DIARRHEA: 0
RHINORRHEA: 0
SINUS PRESSURE: 1
NAUSEA: 0
VOMITING: 0
SORE THROAT: 1
CONSTIPATION: 0
COUGH: 1
SHORTNESS OF BREATH: 0

## 2022-10-11 ASSESSMENT — PATIENT HEALTH QUESTIONNAIRE - PHQ9
SUM OF ALL RESPONSES TO PHQ QUESTIONS 1-9: 0
2. FEELING DOWN, DEPRESSED OR HOPELESS: 0
SUM OF ALL RESPONSES TO PHQ QUESTIONS 1-9: 0
SUM OF ALL RESPONSES TO PHQ QUESTIONS 1-9: 0
1. LITTLE INTEREST OR PLEASURE IN DOING THINGS: 0
SUM OF ALL RESPONSES TO PHQ QUESTIONS 1-9: 0
SUM OF ALL RESPONSES TO PHQ9 QUESTIONS 1 & 2: 0

## 2022-10-11 NOTE — PROGRESS NOTES
Kelle Chen, APRN-CNP  [facility]  [id number]  [address]  [address] [address]  Dept: [phone]  Dept Fax: [phone]     PATIENT ID: Liliana Saravia is a 38 y.o. female. HPI:  Established patient presenting via virtual visit today for an acute visit secondary to cough, congestion, sore throat, headache, sinus pain and pressure. Pt denies any fever or chills. Pt today denies any HA, chest pain, or SOB. Pt denies any N/V/D/C or abdominal pain. Otherwise pt doing well on current tx and voices no other concerns. My previous office notes, labs and diagnostic studies were reviewed prior to and during encounter. The patient's past medical, surgical, social, and family history as well as her current medications and allergies were reviewed as documented in today's encounter by ABRAHAM Lomas. Current Outpatient Medications on File Prior to Visit   Medication Sig Dispense Refill    meloxicam (MOBIC) 15 MG tablet TAKE 1 TABLET BY MOUTH EVERY DAY 90 tablet 1    norethindrone-ethinyl estradiol (LOESTRIN FE 1/20) 1-20 MG-MCG per tablet Take 1 tablet by mouth daily 3 packet 0    FLUoxetine (PROZAC) 40 MG capsule Take 1 capsule by mouth daily 90 capsule 1    lisinopril-hydroCHLOROthiazide (PRINZIDE;ZESTORETIC) 20-12.5 MG per tablet Take 2 tablets by mouth daily 180 tablet 1    Red Yeast Rice 600 MG CAPS       Omega-3 Fatty Acids (FISH OIL OMEGA-3) 1000 MG CAPS       VITAMIN D PO Take 10,000 Units by mouth daily      fluticasone (FLONASE) 50 MCG/ACT nasal spray 1 spray by Nasal route daily 16 g 0     No current facility-administered medications on file prior to visit. SUBJECTIVE:     Review of Systems   Constitutional:  Negative for activity change, fatigue and fever. HENT:  Positive for congestion, sinus pressure, sinus pain and sore throat. Negative for ear pain and rhinorrhea. Respiratory:  Positive for cough.  Negative for chest tightness and shortness of breath. Cardiovascular:  Negative for chest pain and palpitations. Gastrointestinal:  Negative for abdominal distention, abdominal pain, constipation, diarrhea, nausea and vomiting. Endocrine: Negative for polydipsia, polyphagia and polyuria. Genitourinary:  Negative for difficulty urinating and dysuria. Musculoskeletal:  Negative for arthralgias, back pain and myalgias. Skin:  Negative for rash. Neurological:  Positive for headaches. Negative for dizziness, weakness and light-headedness. Hematological:  Negative for adenopathy. Psychiatric/Behavioral:  Negative for agitation and behavioral problems. The patient is not nervous/anxious. OBJECTIVE:  No vitals were taken during this encounter, as this is a virtual visit. Physical Exam  Vitals reviewed: Vital signs unavailable, as this is a virtual visit. Constitutional:       General: She is not in acute distress. Appearance: Normal appearance. She is not ill-appearing or toxic-appearing. Pulmonary:      Effort: No tachypnea or accessory muscle usage. Comments: Patient able to talk in full sentences without difficulty   Neurological:      General: No focal deficit present. Mental Status: She is alert and oriented to person, place, and time. Psychiatric:         Mood and Affect: Mood normal.         Speech: Speech normal.         Behavior: Behavior normal. Behavior is cooperative. ASSESSMENT:   Diagnosis Orders   1. Acute bacterial sinusitis  amoxicillin-clavulanate (AUGMENTIN) 875-125 MG per tablet      2. Bronchitis  amoxicillin-clavulanate (AUGMENTIN) 875-125 MG per tablet        PLAN:  1. Acute bacterial sinusitis  2.  Bronchitis  - Push fluids  - Tylenol/Motrin    - OTC Mucinex-D BID   - Start Flonase- advised to take 2 sprays to each nostril daily while s/s are persisting, then may decrease to PRN as ordered  - Will call in an ATB  - Symptomatic therapy suggested: push fluids, rest, use acetaminophen, ibuprofen prn and return office visit prn if symptoms persist or worsen. - Call or return to clinic prn if these symptoms worsen or fail to improve as anticipated. - Rest of systems unchanged, continue current treatments. - On this date October 11, 2022,  I have spent greater than 50% of this visit reviewing previous notes, test results and/or face to face with the patient discussing the diagnoses, importance of compliance with the treatment plan, counseling, coordinating care as well as documenting on the day of the visit. León Puentes, was evaluated through a synchronous (real-time) audio-video encounter. The patient (or guardian if applicable) is aware that this is a billable service, which includes applicable co-pays. This Virtual Visit was conducted with patient's (and/or legal guardian's) consent. The visit was conducted pursuant to the emergency declaration under the [address] [place] [address], [address] [place] [address] authority and the [employer] and [employer] General Act. Patient identification was verified, and a caregiver was present when appropriate. The patient was located at Home: [address] [place] [address]  [address]. Provider was located at [facility] ([place] [address]): [address], [address]  [place],  [address] Total time spent for this encounter: Not billed by time    --MARCOS Paris NP on 10/11/2022 at 7:53 AM    An electronic signature was used to authenticate this note.

## 2022-10-13 DIAGNOSIS — I10 ESSENTIAL HYPERTENSION: ICD-10-CM

## 2022-10-13 RX ORDER — LISINOPRIL AND HYDROCHLOROTHIAZIDE 20; 12.5 MG/1; MG/1
2 TABLET ORAL DAILY
Qty: 180 TABLET | Refills: 1 | Status: SHIPPED | OUTPATIENT
Start: 2022-10-13

## 2022-10-19 ENCOUNTER — PROCEDURE VISIT (OUTPATIENT)
Dept: OBGYN CLINIC | Age: 42
End: 2022-10-19
Payer: COMMERCIAL

## 2022-10-19 ENCOUNTER — HOSPITAL ENCOUNTER (OUTPATIENT)
Age: 42
Setting detail: SPECIMEN
Discharge: HOME | End: 2022-10-19

## 2022-10-19 VITALS — SYSTOLIC BLOOD PRESSURE: 114 MMHG | WEIGHT: 225 LBS | DIASTOLIC BLOOD PRESSURE: 68 MMHG | BODY MASS INDEX: 41.15 KG/M2

## 2022-10-19 DIAGNOSIS — N92.0 MENORRHAGIA WITH REGULAR CYCLE: Primary | ICD-10-CM

## 2022-10-19 DIAGNOSIS — Z01.812 PRE-PROCEDURE LAB EXAM: ICD-10-CM

## 2022-10-19 PROCEDURE — 81025 URINE PREGNANCY TEST: CPT | Performed by: NURSE PRACTITIONER

## 2022-10-19 PROCEDURE — 58100 BIOPSY OF UTERUS LINING: CPT | Performed by: NURSE PRACTITIONER

## 2022-10-19 NOTE — PROGRESS NOTES
10/19/2022    Gladys Craft is a 38 y.o. female, [id number]      Patient's last menstrual period was 10/05/2022 (approximate). Chief Complaint   Patient presents with    Procedure       Urine pregnancy test: negative     Past Medical History:   Diagnosis Date    Anxiety     Depression     Hypertension          Past Surgical History:   Procedure Laterality Date     SECTION      x2     WISDOM TOOTH EXTRACTION           Family History   Problem Relation Age of Onset    Other Mother         thyroid problems         Social History     Tobacco Use    Smoking status: Former     Packs/day: 0.50     Types: Cigarettes     Quit date: 6/10/2022     Years since quittin.3    Smokeless tobacco: Never   Vaping Use    Vaping Use: Never used   Substance Use Topics    Alcohol use: Not Currently     Comment: Rarely    Drug use: Never         Current Outpatient Medications   Medication Sig Dispense Refill    lisinopril-hydroCHLOROthiazide (PRINZIDE;ZESTORETIC) 20-12.5 MG per tablet TAKE 2 TABLETS BY MOUTH DAILY 180 tablet 1    amoxicillin-clavulanate (AUGMENTIN) 875-125 MG per tablet Take 1 tablet by mouth 2 times daily for 10 days 20 tablet 0    meloxicam (MOBIC) 15 MG tablet TAKE 1 TABLET BY MOUTH EVERY DAY 90 tablet 1    norethindrone-ethinyl estradiol (LOESTRIN FE /) 1-20 MG-MCG per tablet Take 1 tablet by mouth daily 3 packet 0    FLUoxetine (PROZAC) 40 MG capsule Take 1 capsule by mouth daily 90 capsule 1    fluticasone (FLONASE) 50 MCG/ACT nasal spray 1 spray by Nasal route daily 16 g 0    Red Yeast Rice 600 MG CAPS       Omega-3 Fatty Acids (FISH OIL OMEGA-3) 1000 MG CAPS       VITAMIN D PO Take 10,000 Units by mouth daily       No current facility-administered medications for this visit. Allergies as of 10/19/2022    (No Known Allergies)         Diagnostics:  Exam Date: 2022       Dx: Abnormal Uterine Bleeding, HMB   Exam limited due to anatomy       Uterus: 10.8x5. 4x3.9cm, anteverted   Endometrial Stripe: 2.4mm   Right Ovary: simple appearing cyst measuring 2.2x2.5x2.1cm   Left Ovary: not visualized   No free fluid in pelvis         Blood pressure 114/68, weight 225 lb (102.1 kg), last menstrual period 10/05/2022, not currently breastfeeding. The patient was counseled on the procedure. Risks, benefits and alternatives were reviewed. The patient is aware that this is diagnostic and not curative and a second procedure may be needed. A consent was reviewed and obtained. The patient was positioned comfortably on the exam table. After a bi-manual exam; the uterus was found to be  anteverted with a size of 10 cm. There was no adnexal masses and the bladder was smooth, non-tender and without palpable masses. A sterile speculum was placed into the vagina and the cervix was identified. It was stabilized with an allis clamp. It was cleansed with betadine and the aspirator was then gently passed into the endometrial cavity. Tissue was obtained and sent to pathology. The patient tolerated the procedure well. Post procedure restrictions were reviewed and given to the patient. .  All counts and instruments were correct at the end of the procedure. Assessment:   Diagnosis Orders   1. Menorrhagia with regular cycle        2. Pre-procedure lab exam          Patient Active Problem List    Diagnosis Date Noted    Essential hypertension 02/24/2022    Anxiety and depression 02/24/2022    Hyperlipidemia 02/24/2022    Vitamin D deficiency 02/24/2022           PLAN:  Return to the office for follow-up in December, will notify of results and see sooner if needed  depending on pathology the pathology of the biopsy and for further recommendations. Patient just started OCP.

## 2022-10-21 LAB — SURGICAL PATHOLOGY REPORT: NORMAL

## 2022-11-23 ENCOUNTER — TELEMEDICINE (OUTPATIENT)
Dept: FAMILY MEDICINE CLINIC | Age: 42
End: 2022-11-23
Payer: COMMERCIAL

## 2022-11-23 DIAGNOSIS — J01.90 ACUTE BACTERIAL SINUSITIS: Primary | ICD-10-CM

## 2022-11-23 DIAGNOSIS — R05.1 ACUTE COUGH: ICD-10-CM

## 2022-11-23 DIAGNOSIS — B96.89 ACUTE BACTERIAL SINUSITIS: Primary | ICD-10-CM

## 2022-11-23 PROCEDURE — 99213 OFFICE O/P EST LOW 20 MIN: CPT | Performed by: NURSE PRACTITIONER

## 2022-11-23 RX ORDER — PREDNISONE 20 MG/1
20 TABLET ORAL DAILY
Qty: 5 TABLET | Refills: 0 | Status: SHIPPED | OUTPATIENT
Start: 2022-11-23 | End: 2022-11-28 | Stop reason: ALTCHOICE

## 2022-11-23 RX ORDER — AZITHROMYCIN 250 MG/1
250 TABLET, FILM COATED ORAL SEE ADMIN INSTRUCTIONS
Qty: 6 TABLET | Refills: 0 | Status: SHIPPED | OUTPATIENT
Start: 2022-11-23 | End: 2022-11-28 | Stop reason: ALTCHOICE

## 2022-11-23 ASSESSMENT — ENCOUNTER SYMPTOMS
COLOR CHANGE: 0
ABDOMINAL DISTENTION: 0
COUGH: 1
SHORTNESS OF BREATH: 0
BACK PAIN: 0
RHINORRHEA: 1
ABDOMINAL PAIN: 0
DIARRHEA: 0
NAUSEA: 0
CHEST TIGHTNESS: 0
SORE THROAT: 0
CONSTIPATION: 0

## 2022-11-23 NOTE — PROGRESS NOTES
Clark Rg, APRN-CNP  [facility]  [address] [address], [address]  [address] [address]  Dept: [phone]  Dept Fax: [phone]    Patient ID: Murtis Aschoff is a 38 y.o. female. HPI     Subjective:     Murtis Aschoff is a 38 y.o. female who presents today via virtual visit complaining of started to not feel good on monday, had fever, headache, body aches and then yesterday wasn't able to get out of bed, today she is having sinus congestion, cough, chills and tired. She went to the [employer] on Friday and not sure if maybe she picked something up from there. She hasn't been in direct contact with anyone with covid or flu, but she does know that people at her  work had flu virus. Otherwise pt doing well on current tx and no other concerns today. The patient's past medical, surgical, social, and family history as well as his current medications and allergies were reviewed as documented in today's encounter by ABRAHAM Chávez. Current Outpatient Medications on File Prior to Visit   Medication Sig Dispense Refill    lisinopril-hydroCHLOROthiazide (PRINZIDE;ZESTORETIC) 20-12.5 MG per tablet TAKE 2 TABLETS BY MOUTH DAILY 180 tablet 1    meloxicam (MOBIC) 15 MG tablet TAKE 1 TABLET BY MOUTH EVERY DAY 90 tablet 1    norethindrone-ethinyl estradiol (LOESTRIN FE 1/20) 1-20 MG-MCG per tablet Take 1 tablet by mouth daily 3 packet 0    FLUoxetine (PROZAC) 40 MG capsule Take 1 capsule by mouth daily 90 capsule 1    fluticasone (FLONASE) 50 MCG/ACT nasal spray 1 spray by Nasal route daily 16 g 0    Red Yeast Rice 600 MG CAPS       Omega-3 Fatty Acids (FISH OIL OMEGA-3) 1000 MG CAPS       VITAMIN D PO Take 10,000 Units by mouth daily       No current facility-administered medications on file prior to visit. Review of Systems   Constitutional:  Negative for activity change, fatigue and fever. HENT:  Positive for congestion and rhinorrhea. Negative for ear pain and sore throat. Respiratory:  Positive for cough (greenish- yellow). Negative for chest tightness and shortness of breath. Cardiovascular:  Negative for chest pain and palpitations. Gastrointestinal:  Negative for abdominal distention, abdominal pain, constipation, diarrhea and nausea. Endocrine: Negative for polydipsia, polyphagia and polyuria. Genitourinary:  Negative for difficulty urinating and dysuria. Musculoskeletal:  Negative for arthralgias, back pain and myalgias. Body aches   Skin:  Negative for color change and rash. Neurological:  Negative for dizziness, weakness, light-headedness and headaches. Hematological:  Negative for adenopathy. Psychiatric/Behavioral:  Negative for agitation and behavioral problems. The patient is not nervous/anxious.       Objective:     No Known Allergies,   Past Medical History:   Diagnosis Date    Anxiety     Depression     Hypertension    ,   Past Surgical History:   Procedure Laterality Date     SECTION      x2     WISDOM TOOTH EXTRACTION     ,   Social History     Tobacco Use    Smoking status: Former     Packs/day: 0.50     Types: Cigarettes     Quit date: 6/10/2022     Years since quittin.4    Smokeless tobacco: Never   Vaping Use    Vaping Use: Never used   Substance Use Topics    Alcohol use: Not Currently     Comment: Rarely    Drug use: Never   ,   Family History   Problem Relation Age of Onset    Other Mother         thyroid problems   ,   Immunization History   Administered Date(s) Administered    Pneumococcal Polysaccharide ([id number]) 2012   ,   Health Maintenance   Topic Date Due    COVID-19 Vaccine (1) Never done    Flu vaccine (1) Never done    DTaP/Tdap/Td vaccine (1 - Tdap) 2023 (Originally 2001)    Hepatitis C screen  2023 (Originally 2000)    HIV screen  2023 (Originally 1997)    Depression Monitoring  10/11/2023    Lipids  2027    Cervical cancer screen  08/03/2027    Hepatitis A vaccine  Aged Out    Hib vaccine  Aged Out    Meningococcal (ACWY) vaccine  Aged Out    Pneumococcal 0-64 years Vaccine  Aged Out    Varicella vaccine  Discontinued       PHYSICAL EXAMINATION:  [ INSTRUCTIONS:  \"[x]\" Indicates a positive item  \"[]\" Indicates a negative item  -- DELETE ALL ITEMS NOT EXAMINED]  Vital Signs: Not completed due to virtual visit. Constitutional: [x] Appears well-developed and well-nourished [x] No apparent distress                            [] Abnormal-   Mental status  [x] Alert and awake  [x] Oriented to person/place/time [x]Able to follow commands       Eyes:  EOM    [x]  Normal  [] Abnormal-  Sclera  [x]  Normal  [] Abnormal -         Discharge [x]  None visible  [] Abnormal -     HENT:   [x] Normocephalic, atraumatic. [] Abnormal   [x] Mouth/Throat: Mucous membranes are moist.      External Ears [x] Normal  [] Abnormal-      Neck: [x] No visualized mass      Pulmonary/Chest: [x] Respiratory effort normal.  [x] No visualized signs of difficulty breathing or respiratory distress        [] Abnormal-      Neurological:        [x] No Facial Asymmetry (Cranial nerve 7 motor function) (limited exam to video visit)                       [x] No gaze palsy        [] Abnormal-         Skin:                     [x] No significant exanthematous lesions or discoloration noted on facial skin         [] Abnormal-                                  Psychiatric:           [x] Normal Affect [x] No Hallucinations        [] Abnormal-      Other pertinent observable physical exam findings- Patient appears generally well, is speaking full sentences clearly without any observable SOB, no cough, no diaphoresis. Pt stable during video, has sinus pressure to maxillary sinuses, has deep cough during video and does grimace with it because it hurts. Assessment:      Diagnosis Orders   1. Acute bacterial sinusitis  azithromycin (ZITHROMAX) 250 MG tablet      2.  Acute cough predniSONE (DELTASONE) 20 MG tablet        Plan:     Acute bacterial sinusitis  Acute cough  - rest and increase water intake. - start prednisone and azithromycin as prescribed. - call if symptoms worsen or do not improve.     -For additional symptom relief, I suggest the following over-the-counter remedies: For fevers or pain: acetaminophen (Tylenol) or ibuprofen (Advil, Motrin) or naproxen (Aleve)   For congestion or sinus pressure: medications containing guaifenesin to help break up mucus, such as Mucinex or Robitussin, medications containing pseudoephedrine or phenylephrine, such as Sudafed and nasal steroid sprays, such as Flonase, Sensimist, Rhinocort or Nasonex  For runny nose, sneezing or watery/itchy eyes: less sedating antihistamines, such as loratidine (Claritin), fexofenadine (Allegra) or Cetirizine (Zyrtec)     - Rest of systems unchanged, continue current treatments. - Medications, labs, diagnostic studies, consultations and follow-up as documented in this encounter. Rest of systems unchanged, continue current treatments    Gonzella Bamberger is a 38 y.o. female being evaluated by a Virtual Visit (video visit) encounter to address concerns as mentioned above. A caregiver was present when appropriate. Due to this being a TeleHealth encounter (During AZGOK-76 public health emergency), evaluation of the following organ systems was limited: Vitals/Constitutional/EENT/Resp/CV/GI//MS/Neuro/Skin/Heme-Lymph-Imm. Pursuant to the emergency declaration under the [facility], [address] [place] [address] waiver authority and the [employer] and [employer] Act, this Virtual Visit was conducted with patient's (and/or legal guardian's) consent, to reduce the patient's risk of exposure to COVID-19 and provide necessary medical care.   The patient (and/or legal guardian) has also been advised to contact this office for worsening conditions or problems, and seek emergency medical treatment and/or call 911 if deemed necessary. Patient identification was verified at the start of the visit: Yes    Total time spent for this encounter: Not billed by time    Services were provided through a video synchronous discussion virtually to substitute for in-person clinic visit. Patient and provider were located at their individual homes. --MARCOS Quiros CNP on 11/23/2022 at 10:02 AM    An electronic signature was used to authenticate this note.       MARCOS Quiros-CNP

## 2022-11-28 ENCOUNTER — OFFICE VISIT (OUTPATIENT)
Dept: FAMILY MEDICINE CLINIC | Age: 42
End: 2022-11-28
Payer: COMMERCIAL

## 2022-11-28 VITALS
HEIGHT: 62 IN | DIASTOLIC BLOOD PRESSURE: 84 MMHG | SYSTOLIC BLOOD PRESSURE: 136 MMHG | OXYGEN SATURATION: 96 % | BODY MASS INDEX: 41.77 KG/M2 | WEIGHT: 227 LBS | HEART RATE: 84 BPM

## 2022-11-28 DIAGNOSIS — J01.90 ACUTE BACTERIAL SINUSITIS: Primary | ICD-10-CM

## 2022-11-28 DIAGNOSIS — J40 BRONCHITIS: ICD-10-CM

## 2022-11-28 DIAGNOSIS — B96.89 ACUTE BACTERIAL SINUSITIS: Primary | ICD-10-CM

## 2022-11-28 DIAGNOSIS — H66.003 NON-RECURRENT ACUTE SUPPURATIVE OTITIS MEDIA OF BOTH EARS WITHOUT SPONTANEOUS RUPTURE OF TYMPANIC MEMBRANES: ICD-10-CM

## 2022-11-28 PROCEDURE — 99213 OFFICE O/P EST LOW 20 MIN: CPT | Performed by: NURSE PRACTITIONER

## 2022-11-28 PROCEDURE — 3078F DIAST BP <80 MM HG: CPT | Performed by: NURSE PRACTITIONER

## 2022-11-28 PROCEDURE — 3074F SYST BP LT 130 MM HG: CPT | Performed by: NURSE PRACTITIONER

## 2022-11-28 RX ORDER — DOXYCYCLINE HYCLATE 100 MG
100 TABLET ORAL 2 TIMES DAILY
Qty: 20 TABLET | Refills: 0 | Status: SHIPPED | OUTPATIENT
Start: 2022-11-28 | End: 2022-12-08

## 2022-11-28 ASSESSMENT — ENCOUNTER SYMPTOMS
ABDOMINAL PAIN: 0
VOMITING: 0
BACK PAIN: 0
SHORTNESS OF BREATH: 0
SORE THROAT: 0
DIARRHEA: 0
CHEST TIGHTNESS: 0
ABDOMINAL DISTENTION: 0
RHINORRHEA: 0
CONSTIPATION: 0
NAUSEA: 0
SINUS PRESSURE: 1
SINUS PAIN: 1
COUGH: 1

## 2022-11-28 NOTE — PROGRESS NOTES
Lana Orr, APRN-CNP  [facility]  [address] [address], [address]  [address] [address]  Dept: [phone]  Dept Fax: [phone]     PATIENT ID: Juancarlos Cordoba is a 38 y.o. female. HPI:  Established pt here today for an acute visit secondary to cough, congestion, headache, bilateral ear pain and pressure and sinus pain and pressure. She was seen by my partner on 11/23 and was given Prednisone and a Zpack. She relates that she took the medication as prescribed and has not noticed much difference. Pt denies any fever or chills. Pt today denies any HA, chest pain, or SOB. Pt denies any N/V/D/C or abdominal pain. Otherwise pt doing well on current tx and voices no other concerns. My previous office notes, labs and diagnostic studies were reviewed prior to and during encounter. The patient's past medical, surgical, social, and family history as well as current medications and allergies were reviewed as documented in today's encounter by Irma Galindo, [address] [place] [address]. Current Outpatient Medications on File Prior to Visit   Medication Sig Dispense Refill    lisinopril-hydroCHLOROthiazide (PRINZIDE;ZESTORETIC) 20-12.5 MG per tablet TAKE 2 TABLETS BY MOUTH DAILY 180 tablet 1    meloxicam (MOBIC) 15 MG tablet TAKE 1 TABLET BY MOUTH EVERY DAY 90 tablet 1    norethindrone-ethinyl estradiol (LOESTRIN FE 1/20) 1-20 MG-MCG per tablet Take 1 tablet by mouth daily 3 packet 0    fluticasone (FLONASE) 50 MCG/ACT nasal spray 1 spray by Nasal route daily 16 g 0    Red Yeast Rice 600 MG CAPS       Omega-3 Fatty Acids (FISH OIL OMEGA-3) 1000 MG CAPS       VITAMIN D PO Take 10,000 Units by mouth daily      FLUoxetine (PROZAC) 40 MG capsule Take 1 capsule by mouth daily 90 capsule 1     No current facility-administered medications on file prior to visit. SUBJECTIVE:     Review of Systems   Constitutional:  Negative for activity change, fatigue and fever.    HENT: Positive for congestion, ear pain (and pressure; bilaterally), sinus pressure and sinus pain. Negative for rhinorrhea and sore throat. Respiratory:  Positive for cough. Negative for chest tightness and shortness of breath. Cardiovascular:  Negative for chest pain and palpitations. Gastrointestinal:  Negative for abdominal distention, abdominal pain, constipation, diarrhea, nausea and vomiting. Endocrine: Negative for polydipsia, polyphagia and polyuria. Genitourinary:  Negative for difficulty urinating and dysuria. Musculoskeletal:  Negative for arthralgias, back pain and myalgias. Skin:  Negative for rash. Neurological:  Positive for headaches. Negative for dizziness, weakness and light-headedness. Hematological:  Negative for adenopathy. Psychiatric/Behavioral:  Negative for agitation and behavioral problems. The patient is not nervous/anxious. OBJECTIVE:  /84   Pulse 84   Ht 5' 2\" (1.575 m)   Wt 227 lb (103 kg)   SpO2 96%   BMI 41.52 kg/m²     Physical Exam  Vitals reviewed. Constitutional:       General: She is not in acute distress. HENT:      Head: Normocephalic and atraumatic. Right Ear: A middle ear effusion is present. Left Ear: A middle ear effusion is present. Tympanic membrane is erythematous and bulging. Nose: Congestion present. Right Sinus: Maxillary sinus tenderness present. Left Sinus: Maxillary sinus tenderness present. Mouth/Throat:      Pharynx: Posterior oropharyngeal erythema present. Pulmonary:      Effort: Pulmonary effort is normal. No respiratory distress. Chest:      Comments: Lungs clear with good aeration throughout all lung fields. Neurological:      General: No focal deficit present. Mental Status: She is alert and oriented to person, place, and time. Psychiatric:         Mood and Affect: Mood normal.     ASSESSMENT:   Diagnosis Orders   1.  Acute bacterial sinusitis  doxycycline hyclate (VIBRA-TABS) 100 MG tablet      2. Bronchitis  doxycycline hyclate (VIBRA-TABS) 100 MG tablet      3. Non-recurrent acute suppurative otitis media of both ears without spontaneous rupture of tympanic membranes          PLAN:  1. Acute bacterial sinusitis  2. Bronchitis  3. Non-recurrent acute suppurative otitis media of both ears without spontaneous rupture of tympanic membranes  - Push fluids  - Tylenol/Motrin    - OTC Mucinex-D BID   - Start Flonase- advised to take 2 sprays to each nostril daily while s/s are persisting, then may decrease to PRN as ordered  - Will call in an ATB  - Symptomatic therapy suggested: push fluids, rest, use acetaminophen, ibuprofen prn and return office visit prn if symptoms persist or worsen. - Call or return to clinic prn if these symptoms worsen or fail to improve as anticipated. - Rest of systems unchanged, continue current treatments. - On this date November 28, 2022,  I have spent greater than 50% of this visit reviewing previous notes, test results and/or face to face with the patient discussing the diagnoses, importance of compliance with the treatment plan, counseling, coordinating care as well as documenting on the day of the visit.      Morelia Pierre, APRN-CNP

## 2022-12-06 ENCOUNTER — OFFICE VISIT (OUTPATIENT)
Dept: OBGYN CLINIC | Age: 42
End: 2022-12-06
Payer: COMMERCIAL

## 2022-12-06 ENCOUNTER — HOSPITAL ENCOUNTER (OUTPATIENT)
Age: 42
Setting detail: SPECIMEN
Discharge: HOME | End: 2022-12-06

## 2022-12-06 VITALS
DIASTOLIC BLOOD PRESSURE: 74 MMHG | RESPIRATION RATE: 16 BRPM | BODY MASS INDEX: 41.59 KG/M2 | WEIGHT: 226 LBS | HEIGHT: 62 IN | SYSTOLIC BLOOD PRESSURE: 116 MMHG

## 2022-12-06 DIAGNOSIS — N92.0 MENORRHAGIA WITH REGULAR CYCLE: Primary | ICD-10-CM

## 2022-12-06 DIAGNOSIS — I10 ESSENTIAL HYPERTENSION: ICD-10-CM

## 2022-12-06 DIAGNOSIS — R73.9 HYPERGLYCEMIA: ICD-10-CM

## 2022-12-06 DIAGNOSIS — N83.201 CYST OF RIGHT OVARY: ICD-10-CM

## 2022-12-06 DIAGNOSIS — R10.2 PELVIC PAIN: ICD-10-CM

## 2022-12-06 DIAGNOSIS — E78.5 HYPERLIPIDEMIA, UNSPECIFIED HYPERLIPIDEMIA TYPE: ICD-10-CM

## 2022-12-06 DIAGNOSIS — E55.9 VITAMIN D DEFICIENCY: ICD-10-CM

## 2022-12-06 DIAGNOSIS — E03.9 HYPOTHYROIDISM, UNSPECIFIED TYPE: ICD-10-CM

## 2022-12-06 DIAGNOSIS — Z30.41 ENCOUNTER FOR SURVEILLANCE OF CONTRACEPTIVE PILLS: ICD-10-CM

## 2022-12-06 LAB
ALBUMIN SERPL-MCNC: 4.1 G/DL (ref 3.5–5.2)
ALBUMIN/GLOBULIN RATIO: 1.5 (ref 1–2.5)
ALP BLD-CCNC: 36 U/L (ref 35–104)
ALT SERPL-CCNC: 10 U/L (ref 5–33)
ANION GAP SERPL CALCULATED.3IONS-SCNC: 15 MMOL/L (ref 9–17)
AST SERPL-CCNC: 14 U/L
BILIRUB SERPL-MCNC: 0.4 MG/DL (ref 0.3–1.2)
BUN BLDV-MCNC: 15 MG/DL (ref 6–20)
CALCIUM SERPL-MCNC: 9 MG/DL (ref 8.6–10.4)
CHLORIDE BLD-SCNC: 103 MMOL/L (ref 98–107)
CHOLESTEROL/HDL RATIO: 4.1
CHOLESTEROL: 199 MG/DL
CO2: 21 MMOL/L (ref 20–31)
CREAT SERPL-MCNC: 0.63 MG/DL (ref 0.5–0.9)
ESTIMATED AVERAGE GLUCOSE: 111 MG/DL
GFR SERPL CREATININE-BSD FRML MDRD: >60 ML/MIN/1.73M2
GLUCOSE BLD-MCNC: 95 MG/DL (ref 70–99)
HBA1C MFR BLD: 5.5 % (ref 4–6)
HCT VFR BLD CALC: 42.8 % (ref 36.3–47.1)
HDLC SERPL-MCNC: 49 MG/DL
HEMOGLOBIN: 13.6 G/DL (ref 11.9–15.1)
LDL CHOLESTEROL: 118 MG/DL (ref 0–130)
MCH RBC QN AUTO: 29.2 PG (ref 25.2–33.5)
MCHC RBC AUTO-ENTMCNC: 31.8 G/DL (ref 28.4–34.8)
MCV RBC AUTO: 92 FL (ref 82.6–102.9)
NRBC AUTOMATED: 0 PER 100 WBC
PDW BLD-RTO: 15.3 % (ref 11.8–14.4)
PLATELET # BLD: 281 K/UL (ref 138–453)
PMV BLD AUTO: 11.3 FL (ref 8.1–13.5)
POTASSIUM SERPL-SCNC: 3.9 MMOL/L (ref 3.7–5.3)
RBC # BLD: 4.65 M/UL (ref 3.95–5.11)
SODIUM BLD-SCNC: 139 MMOL/L (ref 135–144)
THYROXINE, FREE: 0.84 NG/DL (ref 0.93–1.7)
TOTAL PROTEIN: 6.9 G/DL (ref 6.4–8.3)
TRIGL SERPL-MCNC: 162 MG/DL
TSH SERPL DL<=0.05 MIU/L-ACNC: 4.07 UIU/ML (ref 0.3–5)
VITAMIN D 25-HYDROXY: 65.8 NG/ML
WBC # BLD: 12.8 K/UL (ref 3.5–11.3)

## 2022-12-06 PROCEDURE — 99214 OFFICE O/P EST MOD 30 MIN: CPT | Performed by: NURSE PRACTITIONER

## 2022-12-06 PROCEDURE — 3078F DIAST BP <80 MM HG: CPT | Performed by: NURSE PRACTITIONER

## 2022-12-06 PROCEDURE — 3074F SYST BP LT 130 MM HG: CPT | Performed by: NURSE PRACTITIONER

## 2022-12-06 RX ORDER — NORETHINDRONE ACETATE AND ETHINYL ESTRADIOL 1MG-20(21)
1 KIT ORAL DAILY
Qty: 3 PACKET | Refills: 2 | Status: SHIPPED | OUTPATIENT
Start: 2022-12-06

## 2022-12-06 NOTE — PROGRESS NOTES
Subjective:  Sriram Eaton is in for follow up contraceptives and heavy periods, pelvic pain, ovarian cyst.     Sriram Eaton   established care for annual exam on 8/3/22 and had additional complaint of HMB. She stated periods are regular every month and last 5 days but heavy not significant clotting but has to change tampon/pad every couple hours especially first few days. Denied significnat pelvic pain or BTB. She stated periods have somewhat been like this since delivery last child in 2007. Admitted to moodiness prior to menses also. She denied new medications/supplements, significant weight changes, significant stressors, fatigue, hirsutism,  palpitations, hair loss, galactorrhea,  easy bruising/bleeding. Denied pelvic pain, vaginal d/c odor or itching. Denied concern STI. She is sexually active with - he has had vasectomy. She had already had labs including CBC, CMP, HGB A1c and TSH per her PCP in June 2022- her TSH was elevated at that time was 6.32 but pcp was managing this. Pelvic US was ordered at her PAP on 8/3/22 and completed, she returned to office on 9/19/22 to follow up and discuss treatment/management. She was found to be + candida on pap and tx with diflucan. She was noted to have right ovarian cyst on pelvic US- she stated at that time has began to realize she does note lower abdominal pelvic pain especially on right side and worse with position changes at times. She states can note this couple times a month but states not new this has been for years even possibly since birth of her son 13 years ago. Exam Date: 8/18/2022       Dx: Abnormal Uterine Bleeding, HMB   Exam limited due to anatomy       Uterus: 10.8x5. 4x3.9cm, anteverted   Endometrial Stripe: 2.4mm   Right Ovary: simple appearing cyst measuring 2.2x2.5x2.1cm   Left Ovary: not visualized   No free fluid in pelvis       It was recommended endometrial biopsy given HMB, obesity- she completed this on 9/19/22:   -BENIGN PROLIFERATIVE ENDOMETRIUM, NEGATIVE FOR HYPERPLASIA AND   CARCINOMA. She was also started on oral contraceptive and here to follow up contraceptive and had repeat US today for ovarian cyst.     She is currently on loestrin 1/20 for Heavy periods, pelvic pain, ovarian cyst.  She doing well on this medications. States her cycle is becoming regular and lasts 4 days but much lighter no pain. Denies heavy bleeding or significant cramping. Denies CP, SOB, headaches, vision changes, calf pain or tenderness, BTB. Denies hx of blood clot or clotting disorder  LMp: 11/15/22          Review of systems per HPI, otherwise negative. Allergies; medications; past medical, surgical, family, and social history; and problem list reviewed as indicated in this encounter. Objective:  Vitals:  Blood pressure 116/74, resp. rate 16, height 5' 2\" (1.575 m), weight 226 lb (102.5 kg), last menstrual period 11/15/2022, not currently breastfeeding. Constitutional: She is oriented to person, place, and time. She appears well-developed and well-nourished and in no acute distress. Cardiovascular: Normal rate and regular rhythm, no murmur, rub, or gallop    Pulmonary/Chest: Effort normal and breath sounds normal. No rales or wheezes. No chest retraction. Abdomen: soft, non tender, bowel sounds active  Extremities: no clubbing, cyanosis, or edema  Neurological:  CN II - XII grossly intact; no focal neurological deficits  Psychiatric:  Well groomed, well dressed. The patient maintains appropriate eye contact and does not appear to be responding to internal stimuli. No agitation    Assessment/ Plan / Medical Decision Making   Diagnosis Orders   1. Menorrhagia with regular cycle  norethindrone-ethinyl estradiol (LOESTRIN FE 1/20) 1-20 MG-MCG per tablet      2. Pelvic pain        3. Encounter for surveillance of contraceptive pills        4.  Cyst of right ovary                Medications, laboratory testing, imaging, consultation, and follow up as documented in this encounter. Heavy/periods- on oral contraceptive and has noted improvement. Cont. Oral contraceptive. Refilled  Call with any unusual bleeding, pain, discharge. DVT signs and symptoms reviewed with patient. Reviewed again benign endometrial pathology. Pelvic pain/ovarian cyst-   Reviewed preliminary US no ovarian masses noted- Informed monitor pelvic pain especially severe one sided notify us, torsion precaustions reviewed. RV PRN/PAP     Lauren received counseling on the following healthy behaviors: medication adherence    Patient given educational materials on birth control pills, ovarian cyst    Was a self-tracking handout given in paper form or via [employer]? No  If yes, see orders or list here. Discussed use, benefit, and side effects of prescribed medications. Barriers to medication compliance addressed. All patient questions answered. Pt voiced understanding. This note is created with the assistance of a speech-recognition program.  While intending to generate a document that actually reflects the content of the visit, no guarantees can be provided that every mistake has been identified and corrected by editing. Of the 30 minute duration appointment visit, Andrew Corey CNP spent at least 50% of the face-to-face time in counseling, explanation of diagnosis, planning of further management, and answering all questions.

## 2022-12-07 RX ORDER — LEVOTHYROXINE SODIUM 0.03 MG/1
25 TABLET ORAL DAILY
Qty: 90 TABLET | Refills: 1 | Status: SHIPPED | OUTPATIENT
Start: 2022-12-07 | End: 2023-03-07

## 2022-12-12 RX ORDER — FLUOXETINE HYDROCHLORIDE 40 MG/1
CAPSULE ORAL
Qty: 90 CAPSULE | Refills: 1 | Status: SHIPPED | OUTPATIENT
Start: 2022-12-12

## 2022-12-15 RX ORDER — MELOXICAM 15 MG/1
TABLET ORAL
Qty: 90 TABLET | Refills: 1 | Status: SHIPPED | OUTPATIENT
Start: 2022-12-15

## 2022-12-27 ENCOUNTER — OFFICE VISIT (OUTPATIENT)
Dept: FAMILY MEDICINE CLINIC | Age: 42
End: 2022-12-27
Payer: COMMERCIAL

## 2022-12-27 VITALS
OXYGEN SATURATION: 99 % | DIASTOLIC BLOOD PRESSURE: 84 MMHG | SYSTOLIC BLOOD PRESSURE: 130 MMHG | WEIGHT: 228.8 LBS | HEART RATE: 74 BPM | BODY MASS INDEX: 41.85 KG/M2

## 2022-12-27 DIAGNOSIS — R63.5 WEIGHT GAIN: ICD-10-CM

## 2022-12-27 DIAGNOSIS — F32.A ANXIETY AND DEPRESSION: ICD-10-CM

## 2022-12-27 DIAGNOSIS — I10 ESSENTIAL HYPERTENSION: Primary | ICD-10-CM

## 2022-12-27 DIAGNOSIS — F41.9 ANXIETY AND DEPRESSION: ICD-10-CM

## 2022-12-27 DIAGNOSIS — E78.5 HYPERLIPIDEMIA, UNSPECIFIED HYPERLIPIDEMIA TYPE: ICD-10-CM

## 2022-12-27 DIAGNOSIS — E03.9 HYPOTHYROIDISM, UNSPECIFIED TYPE: ICD-10-CM

## 2022-12-27 DIAGNOSIS — E55.9 VITAMIN D DEFICIENCY: ICD-10-CM

## 2022-12-27 PROCEDURE — 3074F SYST BP LT 130 MM HG: CPT | Performed by: NURSE PRACTITIONER

## 2022-12-27 PROCEDURE — 3078F DIAST BP <80 MM HG: CPT | Performed by: NURSE PRACTITIONER

## 2022-12-27 PROCEDURE — 99214 OFFICE O/P EST MOD 30 MIN: CPT | Performed by: NURSE PRACTITIONER

## 2022-12-27 RX ORDER — PHENTERMINE HYDROCHLORIDE 37.5 MG/1
37.5 TABLET ORAL
Qty: 30 TABLET | Refills: 0 | Status: SHIPPED | OUTPATIENT
Start: 2022-12-27 | End: 2023-01-26

## 2022-12-27 RX ORDER — CETIRIZINE HYDROCHLORIDE, PSEUDOEPHEDRINE HYDROCHLORIDE 5; 120 MG/1; MG/1
1 TABLET, FILM COATED, EXTENDED RELEASE ORAL 2 TIMES DAILY
Qty: 28 TABLET | Refills: 0 | Status: SHIPPED | OUTPATIENT
Start: 2022-12-27 | End: 2023-01-10

## 2022-12-27 RX ORDER — MELOXICAM 15 MG/1
TABLET ORAL
Qty: 90 TABLET | Refills: 1 | Status: SHIPPED | OUTPATIENT
Start: 2022-12-27

## 2022-12-27 ASSESSMENT — ENCOUNTER SYMPTOMS
ABDOMINAL DISTENTION: 0
CONSTIPATION: 0
COUGH: 0
BACK PAIN: 0
RHINORRHEA: 0
DIARRHEA: 0
CHEST TIGHTNESS: 0
NAUSEA: 0
ABDOMINAL PAIN: 0
VOMITING: 0
SORE THROAT: 0
SHORTNESS OF BREATH: 0

## 2022-12-27 NOTE — PROGRESS NOTES
Rula Olsen, APRN-CNP  [facility]  [id number]  [place] , [address]  [address] [address]  Dept: [phone]  Dept Fax: [phone]     PATIENT ID: Wen Mcgrath is a 38 y.o. female. HPI:  Established pt here today for f/u on chronic medical problems; HTN anxiety, depression, go over labs and/or diagnostic studies, and medication refills. Pt denies any fever or chills. Pt today denies any HA, chest pain, or SOB. Pt denies any N/V/D/C or abdominal pain. Pt states mood is stable on meds. Pt with arthralgias on and off, but stable on meds. She does express concern regarding the inability to loose weight. She has been watching her carbs, sugars and exercising regularly but is not experiencing the weight loss she is expecting. Otherwise pt doing well on current tx and no other concerns today. My previous office notes, labs and diagnostic studies were reviewed prior to and during encounter. The patient's past medical, surgical, social, and family history as well as current medications and allergies were reviewed as documented in today's encounter by ABRAHAM aMy. Current Outpatient Medications on File Prior to Visit   Medication Sig Dispense Refill    FLUoxetine (PROZAC) 40 MG capsule TAKE 1 CAPSULE BY MOUTH EVERY DAY 90 capsule 1    levothyroxine (SYNTHROID) 25 MCG tablet Take 1 tablet by mouth daily 90 tablet 1    norethindrone-ethinyl estradiol (LOESTRIN FE 1/20) 1-20 MG-MCG per tablet Take 1 tablet by mouth daily 3 packet 2    lisinopril-hydroCHLOROthiazide (PRINZIDE;ZESTORETIC) 20-12.5 MG per tablet TAKE 2 TABLETS BY MOUTH DAILY 180 tablet 1    fluticasone (FLONASE) 50 MCG/ACT nasal spray 1 spray by Nasal route daily 16 g 0    Red Yeast Rice 600 MG CAPS       Omega-3 Fatty Acids (FISH OIL OMEGA-3) 1000 MG CAPS       VITAMIN D PO Take 10,000 Units by mouth daily       No current facility-administered medications on file prior to visit. SUBJECTIVE:     Review of Systems   Constitutional:  Negative for activity change, fatigue and fever. HENT:  Negative for congestion, ear pain, rhinorrhea and sore throat. Respiratory:  Negative for cough, chest tightness and shortness of breath. Cardiovascular:  Negative for chest pain and palpitations. Gastrointestinal:  Negative for abdominal distention, abdominal pain, constipation, diarrhea, nausea and vomiting. Endocrine: Negative for polydipsia, polyphagia and polyuria. Genitourinary:  Negative for difficulty urinating and dysuria. Musculoskeletal:  Positive for arthralgias (stable on Meloxicam 15 mg daily). Negative for back pain and myalgias. Skin:  Negative for rash. Neurological:  Negative for dizziness, weakness, light-headedness and headaches. Hematological:  Negative for adenopathy. Psychiatric/Behavioral:  Positive for dysphoric mood (not well controlled on Prozac 40 mg). Negative for agitation and behavioral problems. The patient is not nervous/anxious. OBJECTIVE:  /84   Pulse 74   Wt 228 lb 12.8 oz (103.8 kg)   LMP 12/05/2022 (Approximate)   SpO2 99%   BMI 41.85 kg/m²     Physical Exam  Vitals and nursing note reviewed. Constitutional:       General: She is not in acute distress. Appearance: Normal appearance. She is well-developed. HENT:      Head: Normocephalic and atraumatic. Cardiovascular:      Rate and Rhythm: Normal rate and regular rhythm. Heart sounds: Normal heart sounds. No murmur heard. Pulmonary:      Effort: Pulmonary effort is normal. No respiratory distress. Breath sounds: Normal breath sounds. Chest:      Chest wall: No tenderness. Abdominal:      General: Bowel sounds are normal.      Palpations: Abdomen is soft. Tenderness: There is no abdominal tenderness. Musculoskeletal:         General: Normal range of motion. Cervical back: Normal range of motion. Right lower leg: No edema.       Left lower leg: No edema. Skin:     General: Skin is warm and dry. Findings: No rash. Neurological:      Mental Status: She is alert and oriented to person, place, and time. Psychiatric:         Mood and Affect: Mood normal.         Behavior: Behavior is cooperative. ASSESSMENT:   Diagnosis Orders   1. Essential hypertension  CBC    Comprehensive Metabolic Panel    Hemoglobin A1C    Lipid Panel      2. Hyperlipidemia, unspecified hyperlipidemia type  CBC    Comprehensive Metabolic Panel    Lipid Panel      3. Vitamin D deficiency  Vitamin D 25 Hydroxy      4. Hypothyroidism, unspecified type  TSH    T4, Free      5. Weight gain  phentermine (ADIPEX-P) 37.5 MG tablet      6. BMI 40.0-44.9, adult (HCC)  phentermine (ADIPEX-P) 37.5 MG tablet      7. Anxiety and depression          PLAN:  1. Essential hypertension  - Stable: Medication re-filled as needed, con't medications as prescribed, con't current tx plan  - BP in office today noted; BP: 130/84    - Continue Lisinopril-HCTZ 20-12.5 mg daily as previously prescribed. - Low sodium diet and routine exercise encouraged. - Advised to seek emergent tx if SBP>180 and/or DBP>110, any chest pain, h/a or vision changes. 2. Hyperlipidemia, unspecified hyperlipidemia type  - Stable: Medication re-filled as needed, con't medications as prescribed, con't current tx plan  - Will cont with low fat/chol diet, Red Yeast & Fish Oil as ordered. 3. Vitamin D deficiency  - Stable: Medication re-filled as needed, con't medications as prescribed, con't current tx plan  - Continue Vitamin D supplements as ordered, will continue to follow Vitamin D level  - Please increase foods with Vitamin D, which include cheese, eggs, cereals, yogurt, milk, tofu, any type of beef, salmon or tuna,  spinach, and mushroom.       4. Hypothyroidism, unspecified type  - Stable: Medication re-filled as needed, con't medications as prescribed, con't current tx plan  - Last TSH 4.07 & T4 0.84  - Continue with Levothyroxine 25 mcg daily as previously prescribed. 5. Anxiety and depression  - Stable: Medication re-filled as needed, con't medications as prescribed, con't current tx plan  - Continue with Prozac 40 mg daily as previously prescribed. - Pt encouraged to deep breath and relax through anxious moments   - Offered reassurance and allowed to ventilate feelings and ask questions.   - Non-pharmological coping methods discussed. - Rest of systems unchanged, continue current treatments. 6. Weight gain  7. BMI 40.0-44.9, adult (HCC)  - BP and HR noted on today's visit  - I did d/w pt about starting Adipex and treating for 3 months and will need to be seen monthly for BP and weight check and then will need to take a 6 month hoLiday off the medication.   - I also did stress the importance of exercise to aim for 150 minutes of aerobic activity a week and to watch her diet and calorie intake. - Discussion had with patient regarding the prescribing of a controlled substance for weight loss (Adipex), the provider is required by law to see the patient  an appointment every thirty days. - This is neccessary to document an accurate weight and blood pressure. - It is also essential to assess the patients efforts to lose weight and to ensure there are no contraindications or adverse effects.   - Medication contract discussed, signed and scanned into media. - Patient verbalized understanding.     - On this date December 27, 2022,  I have spent greater than 50% of this visit reviewing previous notes, test results and/or face to face with the patient discussing the diagnoses, importance of compliance with the treatment plan, counseling, coordinating care as well as documenting on the day of the visit.      MARCOS Carrillo-CNP

## 2023-01-26 ASSESSMENT — ENCOUNTER SYMPTOMS
CHEST TIGHTNESS: 0
CONSTIPATION: 0
VOMITING: 0
BACK PAIN: 0
SHORTNESS OF BREATH: 0
RHINORRHEA: 0
COUGH: 0
DIARRHEA: 0
ABDOMINAL DISTENTION: 0
NAUSEA: 0
ABDOMINAL PAIN: 0
SORE THROAT: 0

## 2023-01-26 NOTE — PROGRESS NOTES
Angela Braun, APRN-CNP  [address] [facility]  [id number]  [place] , [address]  [address] [address]  Dept: [phone]  Dept Fax: [phone]     PATIENT ID: Tae Booker is a 38 y.o. female. HPI:  Pt here today for BP and weight check while being on Adipex. A refill of the medication is needed today. Patient reports compliance with the Adipex and is tolerating it without side effects. She has been watching her diet and staying active. She  has lost 5 #'s this past month. Pt denies any fever or chills. Pt today denies any HA, chest pain, or SOB. Pt denies any N/V/D/C or abdominal pain. Otherwise patient is doing well on current tx and voices no other concerns today. My previous office notes, labs and diagnostic studies were reviewed prior to and during encounter. The patient's past medical, surgical, social, and family history as well as his current medications and allergies were reviewed as documented in today's encounter by ABRAHAM Shankar. Current Outpatient Medications on File Prior to Visit   Medication Sig Dispense Refill    meloxicam (MOBIC) 15 MG tablet TAKE 1 TABLET BY MOUTH EVERY DAY 90 tablet 1    FLUoxetine (PROZAC) 40 MG capsule TAKE 1 CAPSULE BY MOUTH EVERY DAY 90 capsule 1    levothyroxine (SYNTHROID) 25 MCG tablet Take 1 tablet by mouth daily 90 tablet 1    norethindrone-ethinyl estradiol (LOESTRIN FE 1/20) 1-20 MG-MCG per tablet Take 1 tablet by mouth daily 3 packet 2    lisinopril-hydroCHLOROthiazide (PRINZIDE;ZESTORETIC) 20-12.5 MG per tablet TAKE 2 TABLETS BY MOUTH DAILY 180 tablet 1    fluticasone (FLONASE) 50 MCG/ACT nasal spray 1 spray by Nasal route daily 16 g 0    Red Yeast Rice 600 MG CAPS       Omega-3 Fatty Acids (FISH OIL OMEGA-3) 1000 MG CAPS       VITAMIN D PO Take 10,000 Units by mouth daily       No current facility-administered medications on file prior to visit.      SUBJECTIVE:     Review of Systems   Constitutional: Negative for activity change, fatigue and fever. HENT:  Negative for congestion, ear pain, rhinorrhea and sore throat. Respiratory:  Negative for cough, chest tightness and shortness of breath. Cardiovascular:  Negative for chest pain and palpitations. Gastrointestinal:  Negative for abdominal distention, abdominal pain, constipation, diarrhea, nausea and vomiting. Endocrine: Negative for polydipsia, polyphagia and polyuria. Genitourinary:  Negative for difficulty urinating and dysuria. Musculoskeletal:  Negative for arthralgias, back pain and myalgias. Skin:  Negative for rash. Neurological:  Negative for dizziness, weakness, light-headedness and headaches. Hematological:  Negative for adenopathy. Psychiatric/Behavioral:  Negative for agitation and behavioral problems. The patient is not nervous/anxious. OBJECTIVE: /86   Pulse 67   Temp 97.7 °F (36.5 °C)   Wt 223 lb (101.2 kg)   LMP 01/16/2023 (Approximate)   SpO2 98%   BMI 40.79 kg/m²      Physical Exam  Vitals reviewed. Constitutional:       General: She is not in acute distress. HENT:      Head: Normocephalic and atraumatic. Pulmonary:      Effort: Pulmonary effort is normal. No respiratory distress. Neurological:      General: No focal deficit present. Mental Status: She is alert and oriented to person, place, and time. Psychiatric:         Mood and Affect: Mood normal.     ASSESSMENT   Diagnosis Orders   1. Weight gain  phentermine (ADIPEX-P) 37.5 MG tablet      2. BMI 40.0-44.9, adult ([facility])  phentermine (ADIPEX-P) 37.5 MG tablet        PLAN:  1. Weight gain  2. BMI 40.0-44.9, adult ([address]  - Doing well, as she has lost 5 lbs since her last visit  - She has been taking the Adipex as prescribed and tolerating it without significant side effects.   - BP and HR noted on today's visit. - Continue with Adipex 37.5 mg daily as previously prescribed.   - This is month 2/3  - I also did stress the importance of exercise to aim for 150 minutes of aerobic activity a week and to watch her diet and calorie intake. - Will continue with monthly BP and weight checks. - Rest of systems unchanged, continue current treatments. - On this date January 27, 2023,  I have spent greater than 50% of this visit reviewing previous notes, test results and/or face to face with the patient discussing the diagnoses, importance of compliance with the treatment plan, counseling, coordinating care as well as documenting on the day of the visit.      Jhonathan Novak, MARCOS-CNP

## 2023-01-27 ENCOUNTER — OFFICE VISIT (OUTPATIENT)
Dept: FAMILY MEDICINE CLINIC | Age: 43
End: 2023-01-27
Payer: COMMERCIAL

## 2023-01-27 VITALS
WEIGHT: 223 LBS | DIASTOLIC BLOOD PRESSURE: 86 MMHG | TEMPERATURE: 97.7 F | BODY MASS INDEX: 40.79 KG/M2 | HEART RATE: 67 BPM | SYSTOLIC BLOOD PRESSURE: 124 MMHG | OXYGEN SATURATION: 98 %

## 2023-01-27 DIAGNOSIS — R63.5 WEIGHT GAIN: Primary | ICD-10-CM

## 2023-01-27 PROCEDURE — 99213 OFFICE O/P EST LOW 20 MIN: CPT | Performed by: NURSE PRACTITIONER

## 2023-01-27 PROCEDURE — 3074F SYST BP LT 130 MM HG: CPT | Performed by: NURSE PRACTITIONER

## 2023-01-27 PROCEDURE — 3079F DIAST BP 80-89 MM HG: CPT | Performed by: NURSE PRACTITIONER

## 2023-01-27 RX ORDER — PHENTERMINE HYDROCHLORIDE 37.5 MG/1
37.5 TABLET ORAL
Qty: 30 TABLET | Refills: 0 | Status: SHIPPED | OUTPATIENT
Start: 2023-01-27 | End: 2023-02-26

## 2023-01-27 ASSESSMENT — PATIENT HEALTH QUESTIONNAIRE - PHQ9
9. THOUGHTS THAT YOU WOULD BE BETTER OFF DEAD, OR OF HURTING YOURSELF: 0
SUM OF ALL RESPONSES TO PHQ QUESTIONS 1-9: 0
3. TROUBLE FALLING OR STAYING ASLEEP: 0
7. TROUBLE CONCENTRATING ON THINGS, SUCH AS READING THE NEWSPAPER OR WATCHING TELEVISION: 0
SUM OF ALL RESPONSES TO PHQ QUESTIONS 1-9: 0
8. MOVING OR SPEAKING SO SLOWLY THAT OTHER PEOPLE COULD HAVE NOTICED. OR THE OPPOSITE, BEING SO FIGETY OR RESTLESS THAT YOU HAVE BEEN MOVING AROUND A LOT MORE THAN USUAL: 0
6. FEELING BAD ABOUT YOURSELF - OR THAT YOU ARE A FAILURE OR HAVE LET YOURSELF OR YOUR FAMILY DOWN: 0
4. FEELING TIRED OR HAVING LITTLE ENERGY: 0
2. FEELING DOWN, DEPRESSED OR HOPELESS: 0
5. POOR APPETITE OR OVEREATING: 0
SUM OF ALL RESPONSES TO PHQ9 QUESTIONS 1 & 2: 0
SUM OF ALL RESPONSES TO PHQ QUESTIONS 1-9: 0
10. IF YOU CHECKED OFF ANY PROBLEMS, HOW DIFFICULT HAVE THESE PROBLEMS MADE IT FOR YOU TO DO YOUR WORK, TAKE CARE OF THINGS AT HOME, OR GET ALONG WITH OTHER PEOPLE: 0
SUM OF ALL RESPONSES TO PHQ QUESTIONS 1-9: 0
1. LITTLE INTEREST OR PLEASURE IN DOING THINGS: 0

## 2023-02-13 ENCOUNTER — TELEMEDICINE (OUTPATIENT)
Dept: FAMILY MEDICINE CLINIC | Age: 43
End: 2023-02-13
Payer: COMMERCIAL

## 2023-02-13 DIAGNOSIS — M54.42 ACUTE BILATERAL LOW BACK PAIN WITH BILATERAL SCIATICA: Primary | ICD-10-CM

## 2023-02-13 DIAGNOSIS — M54.41 ACUTE BILATERAL LOW BACK PAIN WITH BILATERAL SCIATICA: Primary | ICD-10-CM

## 2023-02-13 PROCEDURE — 99213 OFFICE O/P EST LOW 20 MIN: CPT | Performed by: NURSE PRACTITIONER

## 2023-02-13 RX ORDER — PREDNISONE 20 MG/1
TABLET ORAL
Qty: 17 TABLET | Refills: 0 | Status: SHIPPED | OUTPATIENT
Start: 2023-02-13 | End: 2023-02-23

## 2023-02-13 RX ORDER — CYCLOBENZAPRINE HCL 10 MG
10 TABLET ORAL 3 TIMES DAILY PRN
Qty: 21 TABLET | Refills: 0 | Status: SHIPPED | OUTPATIENT
Start: 2023-02-13 | End: 2023-02-23

## 2023-02-13 SDOH — ECONOMIC STABILITY: HOUSING INSECURITY
IN THE LAST 12 MONTHS, WAS THERE A TIME WHEN YOU DID NOT HAVE A STEADY PLACE TO SLEEP OR SLEPT IN A SHELTER (INCLUDING NOW)?: NO

## 2023-02-13 SDOH — ECONOMIC STABILITY: FOOD INSECURITY: WITHIN THE PAST 12 MONTHS, YOU WORRIED THAT YOUR FOOD WOULD RUN OUT BEFORE YOU GOT MONEY TO BUY MORE.: NEVER TRUE

## 2023-02-13 SDOH — ECONOMIC STABILITY: TRANSPORTATION INSECURITY
IN THE PAST 12 MONTHS, HAS LACK OF TRANSPORTATION KEPT YOU FROM MEETINGS, WORK, OR FROM GETTING THINGS NEEDED FOR DAILY LIVING?: NO

## 2023-02-13 SDOH — ECONOMIC STABILITY: FOOD INSECURITY: WITHIN THE PAST 12 MONTHS, THE FOOD YOU BOUGHT JUST DIDN'T LAST AND YOU DIDN'T HAVE MONEY TO GET MORE.: NEVER TRUE

## 2023-02-13 SDOH — ECONOMIC STABILITY: INCOME INSECURITY: HOW HARD IS IT FOR YOU TO PAY FOR THE VERY BASICS LIKE FOOD, HOUSING, MEDICAL CARE, AND HEATING?: NOT HARD AT ALL

## 2023-02-13 ASSESSMENT — ENCOUNTER SYMPTOMS
NAUSEA: 0
ABDOMINAL PAIN: 0
COUGH: 0
CONSTIPATION: 0
SORE THROAT: 0
CHEST TIGHTNESS: 0
SHORTNESS OF BREATH: 0
DIARRHEA: 0
RHINORRHEA: 0
VOMITING: 0
BACK PAIN: 0
ABDOMINAL DISTENTION: 0

## 2023-02-13 NOTE — PROGRESS NOTES
Lida Signs, APRN-CNP  [address] [facility]  [address] [address], [address]  [address] [address]  Dept: [phone]  Dept Fax: [phone]    PATIENT ID:  Romina Rojas    HPI:  Romina Rojas is an established patient who presents today, via virtual visit, complaining of low back pain. She relates that the pain has been ongoing for years. She does follow with a chiropractor and has been going weekly. She relates that she would do well for awhile but now her relief is only lasting a couple of hours. She relates that the pain will shoot into her bilateral hips. She denies numbness or tingling. Patient denies fever, chills or loss of bowel or bladder. Prior history of back problems: recurrent self limited episodes of low back pain in the past. There is not numbness in the legs. Otherwise pt doing well on current tx and no other concerns today. My previous office notes, labs and diagnostic studies were reviewed prior to and during encounter. The patient's past medical, surgical, social, and family history as well as her current medications and allergies were reviewed as documented in today's encounter by ABRAHAM Ureña. Current Outpatient Medications on File Prior to Visit   Medication Sig Dispense Refill    phentermine (ADIPEX-P) 37.5 MG tablet Take 1 tablet by mouth every morning (before breakfast) for 30 days.  Rx 2/3 Max Daily Amount: 37.5 mg 30 tablet 0    meloxicam (MOBIC) 15 MG tablet TAKE 1 TABLET BY MOUTH EVERY DAY 90 tablet 1    FLUoxetine (PROZAC) 40 MG capsule TAKE 1 CAPSULE BY MOUTH EVERY DAY 90 capsule 1    levothyroxine (SYNTHROID) 25 MCG tablet Take 1 tablet by mouth daily 90 tablet 1    norethindrone-ethinyl estradiol (LOESTRIN FE 1/20) 1-20 MG-MCG per tablet Take 1 tablet by mouth daily 3 packet 2    lisinopril-hydroCHLOROthiazide (PRINZIDE;ZESTORETIC) 20-12.5 MG per tablet TAKE 2 TABLETS BY MOUTH DAILY 180 tablet 1    fluticasone (FLONASE) 50 MCG/ACT nasal spray 1 spray by Nasal route daily 16 g 0    Red Yeast Rice 600 MG CAPS       Omega-3 Fatty Acids (FISH OIL OMEGA-3) 1000 MG CAPS       VITAMIN D PO Take 10,000 Units by mouth daily       No current facility-administered medications on file prior to visit. SUBJECTIVE:     Review of Systems   Constitutional:  Negative for activity change, fatigue and fever. HENT:  Negative for congestion, ear pain, rhinorrhea and sore throat. Respiratory:  Negative for cough, chest tightness and shortness of breath. Cardiovascular:  Negative for chest pain and palpitations. Gastrointestinal:  Negative for abdominal distention, abdominal pain, constipation, diarrhea, nausea and vomiting. Endocrine: Negative for polydipsia, polyphagia and polyuria. Genitourinary:  Negative for difficulty urinating and dysuria. Musculoskeletal:  Negative for arthralgias, back pain and myalgias. Skin:  Negative for rash. Neurological:  Negative for dizziness, weakness, light-headedness and headaches. Hematological:  Negative for adenopathy. Psychiatric/Behavioral:  Negative for agitation and behavioral problems. The patient is not nervous/anxious. OBJECTIVE:  There were no vital signs taken as this is a virtual encounter. Physical Exam  Vitals reviewed: Vital signs unavailable, as this is a virtual visit. Constitutional:       General: She is not in acute distress. Appearance: Normal appearance. She is not ill-appearing or toxic-appearing. Pulmonary:      Effort: No tachypnea or accessory muscle usage. Comments: Patient able to talk in full sentences without difficulty   Neurological:      General: No focal deficit present. Mental Status: She is alert and oriented to person, place, and time. Psychiatric:         Mood and Affect: Mood normal.         Speech: Speech normal.         Behavior: Behavior normal. Behavior is cooperative. ASSESSMENT:   Diagnosis Orders   1.  Acute bilateral low back pain with bilateral sciatica  XR LUMBAR SPINE (2-3 VIEWS)    predniSONE (DELTASONE) 20 MG tablet    cyclobenzaprine (FLEXERIL) 10 MG tablet    External Referral To Physical Therapy        PLAN:  1. Acute bilateral low back pain with bilateral sciatica  - Steroid taper  - Will have her stop the Meloxicam while taking Prednisone  - Flexeril 10 mg TID PRN  - Take prednisone w/food in the AM  - Low back stretching was discussed and provided on patients after visit summary.   - Moist heat locally. - Given her back pain has been ongoing for years and she is currently seeing a chiropractor weekly, I do think she would benefit from an updated x-ray  - I will also place a referral to physical therapy  - Advised emergent treatment for any sudden loss of bowel or bladder associated with lower back pain, patient verbalized understanding.  - Call or return to clinic prn if these symptoms worsen or fail to improve as anticipated. - Rest of systems unchanged, continue current treatments. - On this date February 13, 2023,  I have spent greater than 50% of this visit reviewing previous notes, test results and/or face to face with the patient discussing the diagnoses, importance of compliance with the treatment plan, counseling, coordinating care as well as documenting on the day of the visit. Narinder Tolbert, was evaluated through a synchronous (real-time) audio-video encounter. The patient (or guardian if applicable) is aware that this is a billable service, which includes applicable co-pays. This Virtual Visit was conducted with patient's (and/or legal guardian's) consent. The visit was conducted pursuant to the emergency declaration under the [address] [place] [address] authority and the [employer] Act. Patient identification was verified, and a caregiver was present when appropriate.    The patient was located at Home: [address]  Provider was located at  ([place] [address]): [address], [address],  [facility] [address]    Total time spent for this encounter: Not billed by time    --Sherene Gottron, APRN - NP on 2/13/2023 at 11:43 AM    An electronic signature was used to authenticate this note.      MARCOS Hamilton-CNP

## 2023-02-16 RX ORDER — MELOXICAM 15 MG/1
TABLET ORAL
Qty: 90 TABLET | Refills: 1 | Status: SHIPPED | OUTPATIENT
Start: 2023-02-16

## 2023-02-20 DIAGNOSIS — I10 ESSENTIAL HYPERTENSION: ICD-10-CM

## 2023-02-20 RX ORDER — LISINOPRIL AND HYDROCHLOROTHIAZIDE 20; 12.5 MG/1; MG/1
2 TABLET ORAL DAILY
Qty: 180 TABLET | Refills: 1 | Status: SHIPPED | OUTPATIENT
Start: 2023-02-20

## 2023-02-20 RX ORDER — LEVOTHYROXINE SODIUM 0.03 MG/1
25 TABLET ORAL DAILY
Qty: 90 TABLET | Refills: 1 | Status: SHIPPED | OUTPATIENT
Start: 2023-02-20

## 2023-02-27 ENCOUNTER — OFFICE VISIT (OUTPATIENT)
Dept: FAMILY MEDICINE CLINIC | Age: 43
End: 2023-02-27
Payer: COMMERCIAL

## 2023-02-27 VITALS
HEART RATE: 92 BPM | SYSTOLIC BLOOD PRESSURE: 120 MMHG | WEIGHT: 221.6 LBS | TEMPERATURE: 97.3 F | OXYGEN SATURATION: 97 % | DIASTOLIC BLOOD PRESSURE: 80 MMHG | BODY MASS INDEX: 40.53 KG/M2

## 2023-02-27 DIAGNOSIS — R63.5 WEIGHT GAIN: Primary | ICD-10-CM

## 2023-02-27 PROCEDURE — 3074F SYST BP LT 130 MM HG: CPT | Performed by: NURSE PRACTITIONER

## 2023-02-27 PROCEDURE — 3079F DIAST BP 80-89 MM HG: CPT | Performed by: NURSE PRACTITIONER

## 2023-02-27 PROCEDURE — 99213 OFFICE O/P EST LOW 20 MIN: CPT | Performed by: NURSE PRACTITIONER

## 2023-02-27 RX ORDER — PHENTERMINE HYDROCHLORIDE 37.5 MG/1
37.5 TABLET ORAL
Qty: 30 TABLET | Refills: 0 | Status: SHIPPED | OUTPATIENT
Start: 2023-02-27 | End: 2023-03-29

## 2023-02-27 ASSESSMENT — ENCOUNTER SYMPTOMS
CONSTIPATION: 0
NAUSEA: 0
ABDOMINAL DISTENTION: 0
ABDOMINAL PAIN: 0
VOMITING: 0
DIARRHEA: 0
RHINORRHEA: 0
COUGH: 0
CHEST TIGHTNESS: 0
SHORTNESS OF BREATH: 0
SORE THROAT: 0
BACK PAIN: 0

## 2023-02-27 NOTE — PROGRESS NOTES
Galindo Warren, APRN-CNP  [address] [facility]  [id number]  [place] [address]  [address] [address]  Dept: [phone]  Dept Fax: [phone]     PATIENT ID: Radhames Villela is a 41 y.o. female. HPI:  Pt here today for BP and weight check while being on Adipex. A refill of the medication is needed today. Patient reports compliance with the Adipex and is tolerating it without side effects. She has been watching her diet and staying active. She  has lost  another 2 #'s this past month. Pt denies any fever or chills. Pt today denies any HA, chest pain, or SOB. Pt denies any N/V/D/C or abdominal pain. Otherwise patient is doing well on current tx and voices no other concerns today. My previous office notes, labs and diagnostic studies were reviewed prior to and during encounter. The patient's past medical, surgical, social, and family history as well as his current medications and allergies were reviewed as documented in today's encounter by ABRAHAM Harris. Current Outpatient Medications on File Prior to Visit   Medication Sig Dispense Refill    levothyroxine (SYNTHROID) 25 MCG tablet Take 1 tablet by mouth daily 90 tablet 1    lisinopril-hydroCHLOROthiazide (PRINZIDE;ZESTORETIC) 20-12.5 MG per tablet Take 2 tablets by mouth daily 180 tablet 1    FLUoxetine (PROZAC) 40 MG capsule TAKE 1 CAPSULE BY MOUTH EVERY DAY 90 capsule 1    norethindrone-ethinyl estradiol (LOESTRIN FE 1/20) 1-20 MG-MCG per tablet Take 1 tablet by mouth daily 3 packet 2    fluticasone (FLONASE) 50 MCG/ACT nasal spray 1 spray by Nasal route daily 16 g 0    Red Yeast Rice 600 MG CAPS       Omega-3 Fatty Acids (FISH OIL OMEGA-3) 1000 MG CAPS       VITAMIN D PO Take 10,000 Units by mouth daily      meloxicam (MOBIC) 15 MG tablet TAKE 1 TABLET BY MOUTH EVERY DAY (Patient not taking: Reported on 2/27/2023) 90 tablet 1     No current facility-administered medications on file prior to visit. SUBJECTIVE:     Review of Systems   Constitutional:  Negative for activity change, fatigue and fever. HENT:  Negative for congestion, ear pain, rhinorrhea and sore throat. Respiratory:  Negative for cough, chest tightness and shortness of breath. Cardiovascular:  Negative for chest pain and palpitations. Gastrointestinal:  Negative for abdominal distention, abdominal pain, constipation, diarrhea, nausea and vomiting. Endocrine: Negative for polydipsia, polyphagia and polyuria. Genitourinary:  Negative for difficulty urinating and dysuria. Musculoskeletal:  Negative for arthralgias, back pain and myalgias. Skin:  Negative for rash. Neurological:  Negative for dizziness, weakness, light-headedness and headaches. Hematological:  Negative for adenopathy. Psychiatric/Behavioral:  Negative for agitation and behavioral problems. The patient is not nervous/anxious. OBJECTIVE: /80   Pulse 92   Temp 97.3 °F (36.3 °C)   Wt 221 lb 9.6 oz (100.5 kg)   LMP 01/27/2023 (Exact Date)   SpO2 97%   BMI 40.53 kg/m²      Physical Exam  Vitals reviewed. Constitutional:       General: She is not in acute distress. HENT:      Head: Normocephalic and atraumatic. Pulmonary:      Effort: Pulmonary effort is normal. No respiratory distress. Neurological:      General: No focal deficit present. Mental Status: She is alert and oriented to person, place, and time. Psychiatric:         Mood and Affect: Mood normal.     ASSESSMENT   Diagnosis Orders   1. Weight gain  phentermine (ADIPEX-P) 37.5 MG tablet      2. BMI 40.0-44.9, adult ([facility])  phentermine (ADIPEX-P) 37.5 MG tablet        PLAN:  1. Weight gain  2. BMI 40.0-44.9, adult ([facility] [address]  - Doing well, as she has lost another 2 lbs since her last visit  - She has been taking the Adipex as prescribed and tolerating it without significant side effects.   - BP and HR noted on today's visit.   - Continue with Adipex 37.5 mg daily as previously prescribed. - This is month 3/3  - I also did stress the importance of exercise to aim for 150 minutes of aerobic activity a week and to watch her diet and calorie intake. - Will continue with monthly BP and weight checks. - Rest of systems unchanged, continue current treatments. - On this date February 27, 2023,  I have spent greater than 50% of this visit reviewing previous notes, test results and/or face to face with the patient discussing the diagnoses, importance of compliance with the treatment plan, counseling, coordinating care as well as documenting on the day of the visit.      Taco Gracia, MARCOS-CNP

## 2023-03-01 ENCOUNTER — HOSPITAL ENCOUNTER (OUTPATIENT)
Dept: WOMENS IMAGING | Age: 43
Discharge: HOME | End: 2023-03-03
Payer: COMMERCIAL

## 2023-03-01 DIAGNOSIS — R92.8 ABNORMAL MAMMOGRAM OF LEFT BREAST: ICD-10-CM

## 2023-03-01 PROCEDURE — 76642 ULTRASOUND BREAST LIMITED: CPT

## 2023-03-02 DIAGNOSIS — R92.8 ABNORMAL MAMMOGRAM OF LEFT BREAST: Primary | ICD-10-CM

## 2023-03-13 ENCOUNTER — TELEPHONE (OUTPATIENT)
Dept: FAMILY MEDICINE CLINIC | Age: 43
End: 2023-03-13

## 2023-03-13 RX ORDER — TIZANIDINE 4 MG/1
4 TABLET ORAL 3 TIMES DAILY
Qty: 30 TABLET | Refills: 0 | Status: SHIPPED | OUTPATIENT
Start: 2023-03-13 | End: 2023-03-23

## 2023-03-27 ENCOUNTER — HOSPITAL ENCOUNTER (OUTPATIENT)
Dept: MRI IMAGING | Age: 43
Discharge: HOME | End: 2023-03-29
Payer: COMMERCIAL

## 2023-03-27 DIAGNOSIS — M54.42 ACUTE BILATERAL LOW BACK PAIN WITH BILATERAL SCIATICA: ICD-10-CM

## 2023-03-27 DIAGNOSIS — M54.41 ACUTE BILATERAL LOW BACK PAIN WITH BILATERAL SCIATICA: ICD-10-CM

## 2023-03-27 PROCEDURE — 72148 MRI LUMBAR SPINE W/O DYE: CPT

## 2023-03-28 ENCOUNTER — OFFICE VISIT (OUTPATIENT)
Dept: FAMILY MEDICINE CLINIC | Age: 43
End: 2023-03-28
Payer: COMMERCIAL

## 2023-03-28 VITALS
SYSTOLIC BLOOD PRESSURE: 128 MMHG | TEMPERATURE: 98.4 F | OXYGEN SATURATION: 98 % | HEART RATE: 77 BPM | WEIGHT: 223.8 LBS | DIASTOLIC BLOOD PRESSURE: 84 MMHG | BODY MASS INDEX: 40.93 KG/M2

## 2023-03-28 DIAGNOSIS — E78.5 HYPERLIPIDEMIA, UNSPECIFIED HYPERLIPIDEMIA TYPE: ICD-10-CM

## 2023-03-28 DIAGNOSIS — R73.9 HYPERGLYCEMIA: ICD-10-CM

## 2023-03-28 DIAGNOSIS — E03.9 HYPOTHYROIDISM, UNSPECIFIED TYPE: ICD-10-CM

## 2023-03-28 DIAGNOSIS — F32.A ANXIETY AND DEPRESSION: ICD-10-CM

## 2023-03-28 DIAGNOSIS — R63.5 WEIGHT GAIN: ICD-10-CM

## 2023-03-28 DIAGNOSIS — M54.41 ACUTE BILATERAL LOW BACK PAIN WITH BILATERAL SCIATICA: ICD-10-CM

## 2023-03-28 DIAGNOSIS — E55.9 VITAMIN D DEFICIENCY: ICD-10-CM

## 2023-03-28 DIAGNOSIS — F41.9 ANXIETY AND DEPRESSION: ICD-10-CM

## 2023-03-28 DIAGNOSIS — I10 ESSENTIAL HYPERTENSION: Primary | ICD-10-CM

## 2023-03-28 DIAGNOSIS — M51.36 BULGING LUMBAR DISC: ICD-10-CM

## 2023-03-28 DIAGNOSIS — M54.42 ACUTE BILATERAL LOW BACK PAIN WITH BILATERAL SCIATICA: ICD-10-CM

## 2023-03-28 PROCEDURE — 3079F DIAST BP 80-89 MM HG: CPT | Performed by: NURSE PRACTITIONER

## 2023-03-28 PROCEDURE — 99214 OFFICE O/P EST MOD 30 MIN: CPT | Performed by: NURSE PRACTITIONER

## 2023-03-28 PROCEDURE — 3074F SYST BP LT 130 MM HG: CPT | Performed by: NURSE PRACTITIONER

## 2023-03-28 ASSESSMENT — ENCOUNTER SYMPTOMS
SORE THROAT: 0
BACK PAIN: 1
ABDOMINAL DISTENTION: 0
SHORTNESS OF BREATH: 0
CONSTIPATION: 0
CHEST TIGHTNESS: 0
NAUSEA: 0
RHINORRHEA: 0
ABDOMINAL PAIN: 0
COUGH: 0
DIARRHEA: 0
VOMITING: 0

## 2023-04-03 ENCOUNTER — TELEPHONE (OUTPATIENT)
Dept: FAMILY MEDICINE CLINIC | Age: 43
End: 2023-04-03

## 2023-04-03 NOTE — TELEPHONE ENCOUNTER
----- Message from Teetee Daley sent at 4/3/2023  1:39 PM EDT -----  Subject: Message to Provider    QUESTIONS  Information for Provider? Camelia with pain management at [facility] is requesting patients recent demographics and referral note to   be faxed to [phone]. Patient states that she was referred for low   back pain.  ---------------------------------------------------------------------------  --------------  Too [employer] INFO  [phone]; OK to leave message on voicemail  ---------------------------------------------------------------------------  --------------  SCRIPT ANSWERS  Relationship to Patient? Third Party  Third Party Type? Physician Office? Representative Name?  Camelia- Pain management at [facility]

## 2023-05-04 RX ORDER — FLUOXETINE HYDROCHLORIDE 40 MG/1
40 CAPSULE ORAL DAILY
Qty: 90 CAPSULE | Refills: 1 | Status: SHIPPED | OUTPATIENT
Start: 2023-05-04

## 2023-05-04 NOTE — TELEPHONE ENCOUNTER
Please Approve or Refuse.   Send to Pharmacy per Pt's Request:      Next Visit Date:  9/29/2023   Last Visit Date: 3/28/2023    Hemoglobin A1C (%)   Date Value   12/06/2022 5.5   06/22/2022 5.6   02/24/2022 5.9             ( goal A1C is < 7)   BP Readings from Last 3 Encounters:   03/28/23 128/84   02/27/23 120/80   01/27/23 124/86          (goal 120/80)  BUN   Date Value Ref Range Status   12/06/2022 15 6 - 20 mg/dL Final     Creatinine   Date Value Ref Range Status   12/06/2022 0.63 0.50 - 0.90 mg/dL Final     Potassium   Date Value Ref Range Status   12/06/2022 3.9 3.7 - 5.3 mmol/L Final

## 2023-07-20 RX ORDER — MELOXICAM 15 MG/1
TABLET ORAL
Qty: 90 TABLET | Refills: 1 | Status: SHIPPED | OUTPATIENT
Start: 2023-07-20

## 2023-07-25 ENCOUNTER — OFFICE VISIT (OUTPATIENT)
Dept: FAMILY MEDICINE CLINIC | Age: 43
End: 2023-07-25
Payer: COMMERCIAL

## 2023-07-25 VITALS
SYSTOLIC BLOOD PRESSURE: 116 MMHG | DIASTOLIC BLOOD PRESSURE: 82 MMHG | HEART RATE: 66 BPM | OXYGEN SATURATION: 99 % | BODY MASS INDEX: 42.07 KG/M2 | WEIGHT: 230 LBS

## 2023-07-25 DIAGNOSIS — R63.5 WEIGHT GAIN: Primary | ICD-10-CM

## 2023-07-25 PROCEDURE — 99213 OFFICE O/P EST LOW 20 MIN: CPT | Performed by: NURSE PRACTITIONER

## 2023-07-25 PROCEDURE — 3079F DIAST BP 80-89 MM HG: CPT | Performed by: NURSE PRACTITIONER

## 2023-07-25 PROCEDURE — 3074F SYST BP LT 130 MM HG: CPT | Performed by: NURSE PRACTITIONER

## 2023-07-25 ASSESSMENT — ENCOUNTER SYMPTOMS
CHEST TIGHTNESS: 0
BACK PAIN: 1
DIARRHEA: 0
RHINORRHEA: 0
VOMITING: 0
NAUSEA: 0
ABDOMINAL PAIN: 0
CONSTIPATION: 0
ABDOMINAL DISTENTION: 0
COUGH: 0
SORE THROAT: 0
SHORTNESS OF BREATH: 0

## 2023-07-25 NOTE — PROGRESS NOTES
Maribel Anderson, APRN-CNP  [address] [place] [address]  [address] [address], [address] [facility] [address]  Dept: [phone]  Dept Fax: [phone]     PATIENT ID: Justin Rubio is a 41 y.o. female. HPI: Established pt here today for the inability to loose weight. She did take Adipex but did not loose the weight she did before. She did get ahold of the  of [employer] and they did come up with a payment plan. She would like to start the medication today. Pt denies any fever or chills. Pt today denies any HA, chest pain, or SOB. Pt denies any N/V/D/C or abdominal pain. Pt states mood is stable on meds. Pt with arthralgias on and off, but stable on meds. My previous office notes, labs and diagnostic studies were reviewed prior to and during encounter. The patient's past medical, surgical, social, and family history as well as current medications and allergies were reviewed as documented in today's encounter by ABRAHAM Guillen. Current Outpatient Medications on File Prior to Visit   Medication Sig Dispense Refill    meloxicam (MOBIC) 15 MG tablet TAKE 1 TABLET DAILY 90 tablet 1    FLUoxetine (PROZAC) 40 MG capsule Take 1 capsule by mouth daily 90 capsule 1    gabapentin (NEURONTIN) 300 MG capsule Take 1 capsule by mouth 3 times daily for 30 days.  90 capsule 0    levothyroxine (SYNTHROID) 25 MCG tablet Take 1 tablet by mouth daily 90 tablet 1    lisinopril-hydroCHLOROthiazide (PRINZIDE;ZESTORETIC) 20-12.5 MG per tablet Take 2 tablets by mouth daily 180 tablet 1    norethindrone-ethinyl estradiol (LOESTRIN FE 1/20) 1-20 MG-MCG per tablet Take 1 tablet by mouth daily 3 packet 2    fluticasone (FLONASE) 50 MCG/ACT nasal spray 1 spray by Nasal route daily 16 g 0    Red Yeast Rice 600 MG CAPS       Omega-3 Fatty Acids (FISH OIL OMEGA-3) 1000 MG CAPS       VITAMIN D PO Take 10,000 Units by mouth daily       No current facility-administered medications on file

## 2023-08-07 RX ORDER — LEVOTHYROXINE SODIUM 25 MCG
TABLET ORAL
Qty: 90 TABLET | Refills: 3 | Status: SHIPPED | OUTPATIENT
Start: 2023-08-07

## 2023-09-05 ENCOUNTER — PATIENT MESSAGE (OUTPATIENT)
Dept: FAMILY MEDICINE CLINIC | Age: 43
End: 2023-09-05

## 2023-09-05 RX ORDER — GABAPENTIN 300 MG/1
300 CAPSULE ORAL 3 TIMES DAILY
Qty: 270 CAPSULE | Refills: 1 | Status: SHIPPED | OUTPATIENT
Start: 2023-09-05 | End: 2024-03-03

## 2023-09-05 NOTE — TELEPHONE ENCOUNTER
From: Sam Christensen  To: Rosibel Mchugh  Sent: 9/5/2023 12:54 PM EDT  Subject: Taking over medication    Good afternoon! I have been seeing a pain management doctor since April for my back pain and they are handling my Gabapentin but since the nerve ablation I had in July, I'm actually having more pain than before the nerve ablation. I do not want to continue with pain management at this time as I haven't had any overall relief that warrants the extra time off work for all of the procedures. Can you take over my Gabapentin as that is the only medication they are handling?

## 2023-10-03 ENCOUNTER — HOSPITAL ENCOUNTER (OUTPATIENT)
Dept: WOMENS IMAGING | Age: 43
Discharge: HOME | End: 2023-10-05
Payer: COMMERCIAL

## 2023-10-03 DIAGNOSIS — R92.8 ABNORMAL MAMMOGRAM OF LEFT BREAST: ICD-10-CM

## 2023-10-03 PROCEDURE — G0279 TOMOSYNTHESIS, MAMMO: HCPCS

## 2023-10-06 ENCOUNTER — HOSPITAL ENCOUNTER (OUTPATIENT)
Age: 43
Setting detail: SPECIMEN
Discharge: HOME | End: 2023-10-06

## 2023-10-06 DIAGNOSIS — R73.9 HYPERGLYCEMIA: ICD-10-CM

## 2023-10-06 DIAGNOSIS — E55.9 VITAMIN D DEFICIENCY: ICD-10-CM

## 2023-10-06 DIAGNOSIS — I10 ESSENTIAL HYPERTENSION: ICD-10-CM

## 2023-10-06 DIAGNOSIS — E78.5 HYPERLIPIDEMIA, UNSPECIFIED HYPERLIPIDEMIA TYPE: ICD-10-CM

## 2023-10-06 DIAGNOSIS — E03.9 HYPOTHYROIDISM, UNSPECIFIED TYPE: ICD-10-CM

## 2023-10-06 LAB
25(OH)D3 SERPL-MCNC: 81.2 NG/ML (ref 30–100)
ALBUMIN SERPL-MCNC: 4.1 G/DL (ref 3.5–5.2)
ALBUMIN/GLOB SERPL: 2 {RATIO} (ref 1–2.5)
ALP SERPL-CCNC: 40 U/L (ref 35–104)
ALT SERPL-CCNC: 14 U/L (ref 10–35)
ANION GAP SERPL CALCULATED.3IONS-SCNC: 11 MMOL/L (ref 9–16)
AST SERPL-CCNC: 17 U/L (ref 10–35)
BILIRUB SERPL-MCNC: 0.3 MG/DL (ref 0–1.2)
BUN SERPL-MCNC: 12 MG/DL (ref 6–20)
CALCIUM SERPL-MCNC: 9.5 MG/DL (ref 8.6–10.4)
CHLORIDE SERPL-SCNC: 102 MMOL/L (ref 98–107)
CHOLEST SERPL-MCNC: 230 MG/DL (ref 0–199)
CHOLESTEROL/HDL RATIO: 5
CO2 SERPL-SCNC: 26 MMOL/L (ref 20–31)
CREAT SERPL-MCNC: 0.6 MG/DL (ref 0.5–0.9)
ERYTHROCYTE [DISTWIDTH] IN BLOOD BY AUTOMATED COUNT: 14.5 % (ref 11.8–14.4)
EST. AVERAGE GLUCOSE BLD GHB EST-MCNC: 94 MG/DL
GFR SERPL CREATININE-BSD FRML MDRD: >60 ML/MIN/1.73M2
GLUCOSE SERPL-MCNC: 81 MG/DL (ref 74–99)
HBA1C MFR BLD: 4.9 % (ref 4–6)
HCT VFR BLD AUTO: 41.7 % (ref 36.3–47.1)
HDLC SERPL-MCNC: 48 MG/DL
HGB BLD-MCNC: 13.9 G/DL (ref 11.9–15.1)
LDLC SERPL CALC-MCNC: 116 MG/DL (ref 0–100)
MCH RBC QN AUTO: 30 PG (ref 25.2–33.5)
MCHC RBC AUTO-ENTMCNC: 33.3 G/DL (ref 28.4–34.8)
MCV RBC AUTO: 90.1 FL (ref 82.6–102.9)
NRBC BLD-RTO: 0 PER 100 WBC
PLATELET # BLD AUTO: 322 K/UL (ref 138–453)
PMV BLD AUTO: 10.6 FL (ref 8.1–13.5)
POTASSIUM SERPL-SCNC: 3.9 MMOL/L (ref 3.7–5.3)
PROT SERPL-MCNC: 6.6 G/DL (ref 6.6–8.7)
RBC # BLD AUTO: 4.63 M/UL (ref 3.95–5.11)
SODIUM SERPL-SCNC: 139 MMOL/L (ref 136–145)
T4 FREE SERPL-MCNC: 0.9 NG/DL (ref 0.93–1.7)
TRIGL SERPL-MCNC: 334 MG/DL (ref 0–149)
TSH SERPL DL<=0.05 MIU/L-ACNC: 1.68 UIU/ML (ref 0.27–4.2)
VLDLC SERPL CALC-MCNC: 67 MG/DL
WBC OTHER # BLD: 10.4 K/UL (ref 3.5–11.3)

## 2023-10-25 ASSESSMENT — ENCOUNTER SYMPTOMS
CONSTIPATION: 0
CHEST TIGHTNESS: 0
DIARRHEA: 0
ABDOMINAL PAIN: 0
NAUSEA: 0
ABDOMINAL DISTENTION: 0
VOMITING: 0
COUGH: 0
SORE THROAT: 0
SHORTNESS OF BREATH: 0
RHINORRHEA: 0

## 2023-10-25 NOTE — PROGRESS NOTES
No edema. Skin:     General: Skin is warm and dry. Findings: No rash. Neurological:      Mental Status: She is alert and oriented to person, place, and time. Psychiatric:         Mood and Affect: Mood normal.         Behavior: Behavior is cooperative. ASSESSMENT:   Diagnosis Orders   1. Essential hypertension  CBC    Comprehensive Metabolic Panel    Lipid Panel      2. Mixed hyperlipidemia  CBC    Comprehensive Metabolic Panel    Lipid Panel      3. Vitamin D deficiency  Vitamin D 25 Hydroxy      4. Hypothyroidism, unspecified type  T4, Free    TSH      5. Anxiety and depression        6. Hyperglycemia        7. Weight gain        8. BMI 40.0-44.9, adult ([address] [facility])          PLAN:  1. Essential hypertension  - Stable: Medication re-filled as needed, con't medications as prescribed, con't current tx plan  - BP in office today noted; BP: 132/88    - Continue Lisinopril-HCTZ 20-12.5 mg daily as previously prescribed. - Low sodium diet and routine exercise encouraged. - Advised to seek emergent tx if SBP>180 and/or DBP>110, any chest pain, h/a or vision changes. 2. Mixed hyperlipidemia  - Total Cholesterol (230) and LDL (116) noted; previous LDL (154)  - Triglycerides (334) noted  - She stopped taking the Red Yeast Rice and Fish Oil because they were such big pills  - After discussion of the treatment of hyperlipidemia, a statin-drug is chosen; prescription for Lipitor (atorvastatin) 10 mg once daily is written. - Will follow LFT's routinely.  - Grapefruit juice may increase the blood levels and side effects.  - Continue to decrease, fats, carbohydrates, and engage in a healthier diet overall, as well as routine exercise (40 minutes of aerobic exercise- moderate to vigorous intensity three to four times a week).        3. Vitamin D deficiency  - Stable: Medication re-filled as needed, con't medications as prescribed, con't current tx plan  - Continue Vitamin D supplements as ordered, will continue to

## 2023-10-27 ENCOUNTER — OFFICE VISIT (OUTPATIENT)
Dept: FAMILY MEDICINE CLINIC | Age: 43
End: 2023-10-27
Payer: COMMERCIAL

## 2023-10-27 VITALS
BODY MASS INDEX: 42.62 KG/M2 | SYSTOLIC BLOOD PRESSURE: 132 MMHG | WEIGHT: 233 LBS | OXYGEN SATURATION: 98 % | HEART RATE: 68 BPM | DIASTOLIC BLOOD PRESSURE: 88 MMHG

## 2023-10-27 DIAGNOSIS — F41.9 ANXIETY AND DEPRESSION: ICD-10-CM

## 2023-10-27 DIAGNOSIS — F32.A ANXIETY AND DEPRESSION: ICD-10-CM

## 2023-10-27 DIAGNOSIS — E03.9 HYPOTHYROIDISM, UNSPECIFIED TYPE: ICD-10-CM

## 2023-10-27 DIAGNOSIS — R63.5 WEIGHT GAIN: ICD-10-CM

## 2023-10-27 DIAGNOSIS — E55.9 VITAMIN D DEFICIENCY: ICD-10-CM

## 2023-10-27 DIAGNOSIS — M51.36 BULGING LUMBAR DISC: ICD-10-CM

## 2023-10-27 DIAGNOSIS — E78.2 MIXED HYPERLIPIDEMIA: ICD-10-CM

## 2023-10-27 DIAGNOSIS — I10 ESSENTIAL HYPERTENSION: Primary | ICD-10-CM

## 2023-10-27 PROCEDURE — 99214 OFFICE O/P EST MOD 30 MIN: CPT | Performed by: NURSE PRACTITIONER

## 2023-10-27 PROCEDURE — 3079F DIAST BP 80-89 MM HG: CPT | Performed by: NURSE PRACTITIONER

## 2023-10-27 PROCEDURE — 3075F SYST BP GE 130 - 139MM HG: CPT | Performed by: NURSE PRACTITIONER

## 2023-10-27 RX ORDER — CETIRIZINE HYDROCHLORIDE 10 MG/1
TABLET ORAL
COMMUNITY
Start: 2023-10-11

## 2023-10-27 RX ORDER — FLUOXETINE HYDROCHLORIDE 40 MG/1
CAPSULE ORAL
COMMUNITY
Start: 2023-10-11

## 2023-10-27 RX ORDER — GABAPENTIN 100 MG/1
100 CAPSULE ORAL 3 TIMES DAILY
Qty: 270 CAPSULE | Refills: 0 | Status: SHIPPED | OUTPATIENT
Start: 2023-10-27 | End: 2024-01-25

## 2023-10-27 RX ORDER — ATORVASTATIN CALCIUM 10 MG/1
10 TABLET, FILM COATED ORAL DAILY
Qty: 90 TABLET | Refills: 3 | Status: SHIPPED | OUTPATIENT
Start: 2023-10-27

## 2023-10-27 ASSESSMENT — ENCOUNTER SYMPTOMS: BACK PAIN: 1

## 2023-11-28 ENCOUNTER — TELEMEDICINE (OUTPATIENT)
Dept: FAMILY MEDICINE CLINIC | Age: 43
End: 2023-11-28
Payer: COMMERCIAL

## 2023-11-28 DIAGNOSIS — M25.531 RIGHT WRIST PAIN: Primary | ICD-10-CM

## 2023-11-28 PROCEDURE — 99213 OFFICE O/P EST LOW 20 MIN: CPT | Performed by: NURSE PRACTITIONER

## 2023-11-28 ASSESSMENT — ENCOUNTER SYMPTOMS
DIARRHEA: 0
CONSTIPATION: 0
NAUSEA: 0
ABDOMINAL PAIN: 0
SORE THROAT: 0
VOMITING: 0
BACK PAIN: 0
RHINORRHEA: 0
COUGH: 0
CHEST TIGHTNESS: 0
SHORTNESS OF BREATH: 0
ABDOMINAL DISTENTION: 0

## 2023-11-28 NOTE — PROGRESS NOTES
No current facility-administered medications on file prior to visit. SUBJECTIVE:     Review of Systems   Constitutional:  Negative for activity change, fatigue and fever. HENT:  Negative for congestion, ear pain, rhinorrhea and sore throat. Respiratory:  Negative for cough, chest tightness and shortness of breath. Cardiovascular:  Negative for chest pain and palpitations. Gastrointestinal:  Negative for abdominal distention, abdominal pain, constipation, diarrhea, nausea and vomiting. Endocrine: Negative for polydipsia, polyphagia and polyuria. Genitourinary:  Negative for difficulty urinating and dysuria. Musculoskeletal:  Negative for arthralgias, back pain and myalgias. Skin:  Negative for rash. Neurological:  Negative for dizziness, weakness, light-headedness and headaches. Hematological:  Negative for adenopathy. Psychiatric/Behavioral:  Negative for agitation and behavioral problems. The patient is not nervous/anxious. OBJECTIVE:  No vitals were taken during this encounter, as this is a virtual visit. Physical Exam  Vitals reviewed: Vital signs unavailable, as this is a virtual visit. Constitutional:       General: She is not in acute distress. Appearance: Normal appearance. She is not ill-appearing or toxic-appearing. Pulmonary:      Effort: No tachypnea or accessory muscle usage. Comments: Patient able to talk in full sentences without difficulty   Neurological:      General: No focal deficit present. Mental Status: She is alert and oriented to person, place, and time. Psychiatric:         Mood and Affect: Mood normal.         Speech: Speech normal.         Behavior: Behavior normal. Behavior is cooperative. ASSESSMENT:   Diagnosis Orders   1. Right wrist pain          PLAN:  1.  Right wrist pain  - Improved some with Prednisone  - Will have her get a wrist splint and have her wear it all day, only off at night  - Ice 2-3x per day  - Call

## 2023-12-25 DIAGNOSIS — I10 ESSENTIAL HYPERTENSION: ICD-10-CM

## 2023-12-26 RX ORDER — LISINOPRIL AND HYDROCHLOROTHIAZIDE 20; 12.5 MG/1; MG/1
2 TABLET ORAL DAILY
Qty: 180 TABLET | Refills: 3 | Status: SHIPPED | OUTPATIENT
Start: 2023-12-26

## 2024-01-04 ENCOUNTER — HOSPITAL ENCOUNTER (OUTPATIENT)
Dept: OCCUPATIONAL THERAPY | Facility: CLINIC | Age: 44
Setting detail: THERAPIES SERIES
Discharge: HOME | End: 2024-01-04
Payer: COMMERCIAL

## 2024-01-04 PROCEDURE — 97110 THERAPEUTIC EXERCISES: CPT

## 2024-01-04 PROCEDURE — 97140 MANUAL THERAPY 1/> REGIONS: CPT

## 2024-01-04 NOTE — CONSULTS
[] [facility]  Outpatient Rehabilitation &  Therapy  [address]  [phone]  F: [phone] [] [facility]  Outpatient Rehabilitation &  Therapy  [address]   [address]  P: [phone]  F: [phone] [x] [facility]  Outpatient Rehabilitation &  Therapy  [address]  P: [phone]  F: [phone] [] [facility]  Outpatient Rehabilitation &  Therapy  [address]   P: [phone]  F: [phone] [] [facility]   Outpatient Rehabilitation   & Therapy  [address]  P: [phone]   F: [phone]       Occupational Therapy Hand & Upper Extremity  Initial Evaluation    Date: 2024      Patient: Lauren Monte  : 1980  MRN: [id number]  Referring Provider:  [facility]NG Gunter   Insurance: [facility] 4040 visits remaining (no auth required)   Medical Diagnosis: M77.8 (ICD-10-CM) - Extensor carpi ulnaris tendinitis    Rehab Codes: pain in right forearm M79.631, or pain in right hand M79.641,  Onset Date: Mid 2023    Gayle Booth Appt: 23    Subjective:   CC: Pn in the R forearm   HPI: Pt reports she began to experience pn the back of her R hand/forearm. Pt states she does not recall a specific moment that caused her pn to start. 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 She is unable to lift a coffee cup w/out having pn.  She is currently taking an oral steroid.    Mechanism of Injury: insidious   Surgery Date: N/A  Precautions: None    Involved Extremity: Right   Dominant Extremity: Right    Past Medical History: Arthritis          Comorbidities: NA    Medications: Refer to Medical chart in Epic Allergies:  None    Pain: Intensity:   6/10 Location: Dorsum of the R hand  Pain Type: constant and with movement/activity  Pain Altered Tx: no  Action Taken:none            Home Environment:    Pt lives in a  and Mobile With 4+ [employer]  The washing machine is on and the main

## 2024-01-04 NOTE — FLOWSHEET NOTE
[] [facility]  Outpatient Rehabilitation &  Therapy  [address]  [phone]  F: [phone] [] [facility]  Outpatient Rehabilitation &  Therapy  [address]   [address]  P: [phone]  F: [phone] [x] [facility]  Outpatient Rehabilitation &  Therapy  [address]  P: [phone]  F: [phone] [] [facility]  Outpatient Rehabilitation &  Therapy  [address]   P: [phone]  F: [phone] [] [facility]   Outpatient Rehabilitation   & Therapy  [address]  P: [phone]   F: [phone]     Occupational Therapy Daily Treatment Note    Date:  2024  Patient Name:  Lauren Monte    :  1980  MRN: [id number]  Referring Provider:  HUNG Gunter   Insurance: [facility] 40/40 visits remaining (no auth required)   Medical Diagnosis: M77.8 (ICD-10-CM) - Extensor carpi ulnaris tendinitis              Rehab Codes: pain in right forearm M79.631, or pain in right hand M79.641,  Onset Date:                     Next Dr. Appt: 23  Visit# / total visits: ; Progress note for Medicare patient due at visit 6    Cancels/No Shows: 0/0      Subjective:    Pain:  [x] Yes  [] No Location: top of hand up wrist and into forearm at times. 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    Precautions: n/a  Surgery procedure and date:    Objective:  Today's Treatment:  Modalities:  Exercises:  Exercise Flow Sheet:  Exercise Reps/Time Weight/Level Comments   Progressive extensor wad stretching 5x5 seconds   Reviewed HEP   Eccentric wrist curl  1x15 Orange band HEP   putty  3min  yellow  Added & Completed    digigrip 10x2  yellow  Isolate finger &     flexbar   Pron/sup  Flex/ext 10X2   yellow Added & Completed    Ball roll   10x2   Playground wrist flexion/extension    Sally transition  1x

## 2024-01-11 ENCOUNTER — HOSPITAL ENCOUNTER (OUTPATIENT)
Dept: OCCUPATIONAL THERAPY | Facility: CLINIC | Age: 44
Setting detail: THERAPIES SERIES
Discharge: HOME | End: 2024-01-11
Payer: COMMERCIAL

## 2024-01-13 ENCOUNTER — PATIENT MESSAGE (OUTPATIENT)
Dept: FAMILY MEDICINE CLINIC | Age: 44
End: 2024-01-13

## 2024-01-15 RX ORDER — FLUOXETINE HYDROCHLORIDE 40 MG/1
40 CAPSULE ORAL DAILY
Qty: 90 CAPSULE | Refills: 1 | Status: SHIPPED | OUTPATIENT
Start: 2024-01-15

## 2024-01-15 NOTE — TELEPHONE ENCOUNTER
From: Lauren Monte  To: Elvi Porter  Sent: 1/13/2024 6:29 PM EST  Subject: Prozac    Can you please send refills in for the 40mg to [facility] mail order?

## 2024-01-22 ENCOUNTER — HOSPITAL ENCOUNTER (OUTPATIENT)
Dept: OCCUPATIONAL THERAPY | Facility: CLINIC | Age: 44
Setting detail: THERAPIES SERIES
Discharge: HOME | End: 2024-01-22
Payer: COMMERCIAL

## 2024-01-22 PROCEDURE — 97110 THERAPEUTIC EXERCISES: CPT

## 2024-01-22 NOTE — FLOWSHEET NOTE
[] [facility]  Outpatient Rehabilitation &  Therapy  [address]  [phone]  F: [phone] [] [facility]  Outpatient Rehabilitation &  Therapy  [address]   [address]  P: [phone]  F: [phone] [x] [facility]  Outpatient Rehabilitation &  Therapy  [address]  P: [phone]  F: [phone] [] [facility]  Outpatient Rehabilitation &  Therapy  [address]   P: [phone]  F: [phone] [] [facility]   Outpatient Rehabilitation   & Therapy  [address]  P: [phone]   F: [phone]     Occupational Therapy Daily Treatment Note    Date:  2024  Patient Name:  Lauren Monte    :  1980  MRN: [id number]  Referring Provider:  HUNG Gunter   Insurance: [facility] - 4040 visits remaining (no auth required)   Medical Diagnosis: M77.8 (ICD-10-CM) - Extensor carpi ulnaris tendinitis              Rehab Codes: pain in right forearm M79.631, or pain in right hand M79.641,  Onset Date:                     Next  Appt:  rescheduled to 24   Visit# / total visits: 3/12; Progress note for Medicare patient due at visit 6    Cancels/No Shows:       Subjective:    Pain:  [x] Yes  [] No Location: side of wrist and a times into forearm. Pain Rating: (0-10 scale) 3/10  Pain altered Tx:  [x] No  [] Yes  Action:  Pt Comments:  It still hurts and not the same time of day or with any specific activity.  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     Precautions: n/a  Surgery procedure and date:    Objective:  Today's Treatment:  Modalities:  Exercises:  Exercise Flow Sheet:  Exercise Reps/Time Weight/Level Comments   Progressive extensor wad stretching 5x5 seconds   Reviewed HEP   Eccentric wrist curl  1x15 Orange band HEP Continued    putty  3min  yellow   Completed    digigrip 10x2   10x1 yellow  Isolate finger      flexbar

## 2024-01-25 ENCOUNTER — HOSPITAL ENCOUNTER (OUTPATIENT)
Dept: OCCUPATIONAL THERAPY | Facility: CLINIC | Age: 44
Setting detail: THERAPIES SERIES
Discharge: HOME | End: 2024-01-25
Payer: COMMERCIAL

## 2024-01-25 PROCEDURE — 97110 THERAPEUTIC EXERCISES: CPT

## 2024-01-25 NOTE — FLOWSHEET NOTE
[] [facility]  Outpatient Rehabilitation &  Therapy  [address]  [phone]  F: [phone] [] [facility]  Outpatient Rehabilitation &  Therapy  [address]   [address]  P: [phone]  F: [phone] [x] [facility]  Outpatient Rehabilitation &  Therapy  [address]  P: [phone]  F: [phone] [] [facility]  Outpatient Rehabilitation &  Therapy  [address]   P: [phone]  F: [phone] [] [facility]   Outpatient Rehabilitation   & Therapy  [address]  P: [phone]   F: [phone]     Occupational Therapy Daily Treatment Note    Date:  2024  Patient Name:  Lauren Monte    :  1980  MRN: [id number]  Referring Provider:  HUNG Gunter   Insurance: [facility] - 4040 visits remaining (no auth required)   Medical Diagnosis: M77.8 (ICD-10-CM) - Extensor carpi ulnaris tendinitis              Rehab Codes: pain in right forearm M79.631, or pain in right hand M79.641,  Onset Date:                     Next . Appt:  rescheduled to 24   Visit# / total visits: ; Progress note for Medicare patient due at visit 6    Cancels/No Shows: 1/      Subjective:    Pain:  [x] Yes  [] No Location: side of wrist and a times into forearm. Pain Rating: (0-10 scale) 3/10  Pain altered Tx:  [x] No  [] Yes  Action:  Pt Comments:  Reports she needs to go get a new wrist brace because the velcro is becoming ruined. Reports she will schedule additional visits once she sees the dr on Monday.     Precautions: n/a  Surgery procedure and date:    Objective:  Today's Treatment:  Modalities:  Exercises:  Exercise Flow Sheet:  Exercise Reps/Time Weight/Level Comments   Progressive extensor wad stretching 5x5 seconds   Reviewed HEP   Eccentric wrist curl  1x15 Orange band HEP Continued    putty  3min  yellow   Completed    digigrip 10x2   10x1 yellow  Isolate finger

## 2024-01-29 ENCOUNTER — OFFICE VISIT (OUTPATIENT)
Dept: ORTHOPEDIC SURGERY | Age: 44
End: 2024-01-29
Payer: COMMERCIAL

## 2024-01-29 ENCOUNTER — PATIENT MESSAGE (OUTPATIENT)
Dept: ORTHOPEDIC SURGERY | Age: 44
End: 2024-01-29

## 2024-01-29 VITALS — HEIGHT: 62 IN | WEIGHT: 233 LBS | BODY MASS INDEX: 42.88 KG/M2

## 2024-01-29 DIAGNOSIS — M77.8 EXTENSOR CARPI ULNARIS TENDINITIS: Primary | ICD-10-CM

## 2024-01-29 PROCEDURE — 99213 OFFICE O/P EST LOW 20 MIN: CPT | Performed by: PHYSICIAN ASSISTANT

## 2024-01-29 NOTE — TELEPHONE ENCOUNTER
PAIGE Aguilar, I notified the [employer] representative who will be reaching out to you soon about a replacement for your brace.

## 2024-01-29 NOTE — PROGRESS NOTES
[facility]                   Greg Muñoz PA-C            [address], [address]               [place], [place] [address]           Dept Phone: [phone]           Dept Fax:  [phone] [address]                       [address]           [place], [place] [address]          Dept Phone: [phone]           Dept Fax:  [phone]      Chief Compliant:  Chief Complaint   Patient presents with    Wrist Pain     right        History of Present Illness:  Lauren returns today.  This is a 43 y.o. female who presents to the clinic today for follow up of right wrist pain.  Please see previous clinic notes for more detailed history.  To this point patient has had pain in the wrist for approximately 3 months continues to be most severe over the dorsal ulnar wrist at last appointment pain was consistent with extensor tenosynovitis she was referred to occupational therapy and given a wrist brace at that time.    Patient returns today reporting that her pain is actually worse than it was at previous visit 1 month ago.  She feels that although she has made some mild improvement in strength with OT she still has significant difficulty with opening jars turning keys and any gripping activity.  Pain continues to be most severe to the dorsal ulnar wrist patient continues to deny any paresthesias, numbness or tingling.  No significant swelling warmth or redness of the wrist      Review of Systems   Constitutional: Negative for fever, chills, sweats, recent illness, or recent injury.   Neurological: Negative for headaches, numbness, or weakness.   Integumentary: Negative for rash, itching, ecchymosis, abrasions, or laceration.   Musculoskeletal: Positive for Wrist Pain (right)       Physical Exam:  Constitutional: Patient is oriented to person, place, and time. Patient appears well-developed and well nourished.   Musculoskeletal:    right Hand/Wrist     Tenderness:  Focal

## 2024-02-09 ENCOUNTER — HOSPITAL ENCOUNTER (OUTPATIENT)
Dept: MRI IMAGING | Age: 44
Discharge: HOME | End: 2024-02-09
Payer: COMMERCIAL

## 2024-02-09 ENCOUNTER — TELEPHONE (OUTPATIENT)
Dept: ORTHOPEDIC SURGERY | Age: 44
End: 2024-02-09

## 2024-02-09 DIAGNOSIS — M77.8 EXTENSOR CARPI ULNARIS TENDINITIS: ICD-10-CM

## 2024-02-09 DIAGNOSIS — M25.531 RIGHT WRIST PAIN: Primary | ICD-10-CM

## 2024-02-09 PROCEDURE — 73221 MRI JOINT UPR EXTREM W/O DYE: CPT

## 2024-02-09 NOTE — TELEPHONE ENCOUNTER
----- Message from HUNG Gunter sent at 2/9/2024 11:30 AM EST -----  Partial tear to the ulnar aspect of the TFCC likely contributing to patient's location of pain.  Would advise continuation of occupational therapy should patient fail to improve with this would advise referral to Dr. Maldonado or Dr. Tarango for discussion of possible injection versus wrist arthroscopy.

## 2024-02-15 ENCOUNTER — HOSPITAL ENCOUNTER (OUTPATIENT)
Dept: OCCUPATIONAL THERAPY | Facility: CLINIC | Age: 44
Setting detail: THERAPIES SERIES
Discharge: HOME | End: 2024-02-15
Payer: COMMERCIAL

## 2024-02-15 PROCEDURE — 97110 THERAPEUTIC EXERCISES: CPT

## 2024-02-15 NOTE — FLOWSHEET NOTE
[] [facility]  Outpatient Rehabilitation &  Therapy  [address]  [phone]  F: [phone] [] [facility]  Outpatient Rehabilitation &  Therapy  [address]   [address]  P: [phone]  F: [phone] [x] [facility]  Outpatient Rehabilitation &  Therapy  [address]  P: [phone]  F: [phone] [] [facility]  Outpatient Rehabilitation &  Therapy  [address]   P: [phone]  F: [phone] [] [facility]   Outpatient Rehabilitation   & Therapy  [address]  P: [phone]   F: [phone]     Occupational Therapy Daily Treatment Note    Date:  2/15/2024  Patient Name:  Lauren Monte    :  1980  MRN: [id number]  Referring Provider:  HUNG Gunter   Insurance: [facility] 40 visits remaining (no auth required)   Medical Diagnosis: M77.8 (ICD-10-CM) - Extensor carpi ulnaris tendinitis              Rehab Codes: pain in right forearm M79.631, or pain in right hand M79.641,  Onset Date:                     Next  Appt:  rescheduled to 24   Visit# / total visits: ; Progress note for patient due at visit 6    Cancels/No Shows:       Subjective:    Pain:  [x] Yes  [] No Location: side of wrist and a times into forearm. Pain Rating: (0-10 scale) 5/10  Pain altered Tx:  [x] No  [] Yes  Action: NA  Pt Comments: Reports she is wearing wrist cock-up splint ~90% of the day. Saw Ortho office a few weeks ago and MRI was ordered. It was found that pt has a partial TFCC tear. Pt is scheduled to see Dr. Dobbins at the end of the month. Reports she sometimes take orthosis off while writing and typing at work.     Precautions: n/a  Surgery procedure and date: NA     Objective:  Today's Treatment:  Modalities: NA  Exercises:  Exercise Flow Sheet:  Exercise Reps/Time Weight/Level Comments   Eccentric wrist curl  1x15 1lb weight Completed    putty  3min  yellow  Not

## 2024-02-15 NOTE — FLOWSHEET NOTE
[] [facility]  Outpatient Rehabilitation &  Therapy  [address]  [phone]  [phone] [] [facility]  Outpatient Rehabilitation &  Therapy  [address]  P: [phone]  F: [phone] [] [facility]  Outpatient Rehabilitation &  Therapy  [address]  P: [phone]  F: [phone] [x] [facility]  Outpatient Rehabilitation &  Therapy  [address]  [phone]  [phone] [] [facility]  Outpatient Rehabilitation &  Therapy  [address]   P: [phone]  F: [phone]  [] [facility]  Outpatient Rehabilitation &  Therapy  [address]  P: [phone]  F: [phone] [] [facility]  Outpatient Rehabilitation &  Therapy  [address]  [address]  P: [phone]  F: [phone] [] [facility]  Outpatient Rehabilitation &  Therapy  [address]  P: [phone]  F: [phone] [] [facility]  Outpatient Rehabilitation &  Therapy  [address]  P: [phone]  F: [phone]  [] [facility] Outpatient Rehabilitation &  Therapy  [address]  P: [phone]  F: [phone]     THERAPY RESPONSIBILITY OF CARE TRANSFER FORM       PATIENT NAME: Lauren Monte  MRN: [id number]   : 1980      TRANSFERRING FACILITY:    [] [place]   [] [facility]   [] [facility]   [x] [facility]   [] [facility] [] [place]   [] [facility]  [] [facility]  [] [facility]   [] [place]  [] [place]   [] Other:          ACCEPTING FACILITY  [] [place]   [] [facility]   [] [facility]   [x] [facility]   [] [facility] [] [place]   [] [facility]  [] [facility]  []  [facility]   [] [place]  [] [facility]   [] Other:         REASON FOR TRANSFER: OT returning from leave of absence

## 2024-02-22 ENCOUNTER — HOSPITAL ENCOUNTER (OUTPATIENT)
Dept: OCCUPATIONAL THERAPY | Facility: CLINIC | Age: 44
Setting detail: THERAPIES SERIES
Discharge: HOME | End: 2024-02-22
Payer: COMMERCIAL

## 2024-02-22 PROCEDURE — 97110 THERAPEUTIC EXERCISES: CPT

## 2024-02-22 NOTE — FLOWSHEET NOTE
[] [facility]  Outpatient Rehabilitation &  Therapy  [address]  [phone]  F: [phone] [] [facility]  Outpatient Rehabilitation &  Therapy  [address]   [address]  P: [phone]  F: [phone] [x] [facility]  Outpatient Rehabilitation &  Therapy  [address]  P: [phone]  F: [phone] [] [facility]  Outpatient Rehabilitation &  Therapy  [address]   P: [phone]  F: [phone] [] [facility]   Outpatient Rehabilitation   & Therapy  [address]  P: [phone]   F: [phone]     Occupational Therapy Daily Treatment Note    Date:  2024  Patient Name:  Lauren Monte    :  1980  MRN: [id number]  Referring Provider:  HUNG Gunter   Insurance: [facility] - 40 visits remaining (no auth required)   Medical Diagnosis: M77.8 (ICD-10-CM) - Extensor carpi ulnaris tendinitis              Rehab Codes: pain in right forearm M79.631, or pain in right hand M79.641,  Onset Date:                     Gayle Booth Appt:  rescheduled to 24   Visit# / total visits: ; Progress note for patient due at visit 6    Cancels/No Shows: 1/2      Subjective:    Pain:  [x] Yes  [] No Location: side of wrist and a times into forearm. Pain Rating: (0-10 scale) 5/10  Pain altered Tx:  [x] No  [] Yes  Action: NA  Pt Comments: Reports she removed metal bar from wrist cock-up splint due to discomfort.     Precautions: n/a  Surgery procedure and date: NA     Objective:  Today's Treatment:  Modalities: NA  Exercises:  Exercise Flow Sheet:  Exercise Reps/Time Weight/Level Comments   Eccentric wrist curl  2 sets of 10 1lb weight Completed    putty  3min  yellow  Not Completed    digigrip 10x2   10x1 yellow  Isolate finger      Cubes & clothespins  1x ~1/3 tip- yellow  ~1/3 3 jaw - red & a few green  ~1/3 lateral - green increased pain a & used Red Completed    Tennis

## 2024-02-22 NOTE — PROGRESS NOTES
[] [facility]  Outpatient Rehabilitation &  Therapy  [address]  [phone]  F: [phone] [] [facility]  Outpatient Rehabilitation &  Therapy  [address]   [address]  P: [phone]  F: [phone] [x] [facility]  Outpatient Rehabilitation &  Therapy  [address]  P: [phone]  F: [phone] [] [facility]  Outpatient Rehabilitation &  Therapy  [address]   P: [phone]  F: [phone] [] [facility]   Outpatient Rehabilitation   & Therapy  [address]  P: [phone]   F: [phone]     Occupational Therapy Progress Note    Date: 2024      Patient: Lauren Monte  : 1980  MRN: [id number]    Referring Provider:  HUNG Gunter   Insurance: [facility] -  visits remaining (no auth required)   Medical Diagnosis: M77.8 (ICD-10-CM) - Extensor carpi ulnaris tendinitis              Rehab Codes: pain in right forearm M79.631, or pain in right hand M79.641,  Onset Date:                     Next Dr. Appt: 24  Visit# / total visits: ; Progress note for patient due at visit 6                  Cancels/No Shows:   Date range of services: 24 to 24    Subjective:  Pain:  [x] Yes  [] No  Location: R wrist  Pain Rating: (0-10 scale) 4/10 Pain altered Tx:  [x] No  [] Yes  Action: NA  Comments: Reports she removed metal bar from wrist orthosis because it was uncomfortable.     Objective:  Tests/Measurements: Upper Extremity Functional Index  Current Functional Level:  32/80 (decrease 32 points) functionally impaired as measured with the Upper Extremity Functional Index Survey.  0-80 scale, with 80 = no Deficits  (The UEFI model does not provide any specific cut off points that could classify the upper limb disability degree, however, a minimal detectable change of 9 points is provided.This means that for improvement or deterioration to be

## 2024-02-27 ENCOUNTER — OFFICE VISIT (OUTPATIENT)
Age: 44
End: 2024-02-27

## 2024-02-27 VITALS — HEIGHT: 62 IN | WEIGHT: 233 LBS | BODY MASS INDEX: 42.88 KG/M2

## 2024-02-27 DIAGNOSIS — M25.531 RIGHT WRIST PAIN: Primary | ICD-10-CM

## 2024-02-27 SDOH — HEALTH STABILITY: PHYSICAL HEALTH: ON AVERAGE, HOW MANY DAYS PER WEEK DO YOU ENGAGE IN MODERATE TO STRENUOUS EXERCISE (LIKE A BRISK WALK)?: 1 DAY

## 2024-02-27 SDOH — HEALTH STABILITY: PHYSICAL HEALTH: ON AVERAGE, HOW MANY MINUTES DO YOU ENGAGE IN EXERCISE AT THIS LEVEL?: 20 MIN

## 2024-02-27 NOTE — PROGRESS NOTES
[facility] Orthopedics & Sports Medicine      [facility] PHYSICIANS [facility]  [facility]IC[facility]  [address]  [place] [address]  Dept: [phone]  Dept Fax: [phone]    Chief Compliant:  Chief Complaint   Patient presents with    Wrist Pain     R wrist         History of Present Illness:  This is a 44 y.o. female who presents to the clinic today for evaluation / follow up of right wrist pain.  Patient states that she has had ongoing right wrist pain since November.  She denies history of injury.  She states that most the pain is all over the aspect of the wrist.  She previously saw Cristhian Muñoz PA-C who ordered an MRI and referred her to us.  She states that this pain was a sudden onset.  She states that most of her pain occurs when she is picking things up as well as squeezing and twisting motions of the wrist.  She is been placed in a cock-up wrist splint since December states this helped a little bit but she still having a lot of discomfort and pain.  She is been taking meloxicam as well as gabapentin for the pain.  She is also tried hand therapy.  She presents today for further evaluation.      Physical Exam:    Right arm: Patient is able to flex and extend the wrist.  She has some pain with wrist extension.  She is able to perform ulnar and radial deviation of the wrist but has some discomfort with ulnar deviation as well as wrist supination.  She has tenderness palpation over the ulnar aspect of the wrist over the TFCC.  She also has some pain over the dorsal aspect on the ulnar side of the wrist as well.  She is able to form a fist and is able to extend her fingers as well as abduct and adduct her fingers.  She has 5-5 strength to finger flexion.  She has a negative Tinel sign at the carpal tunnel.  She has a negative Tinel sign at the cubital tunnel.  But has a little bit of numbness and tingling with ulnar nerve compression in the  5

## 2024-03-17 DIAGNOSIS — N92.0 MENORRHAGIA WITH REGULAR CYCLE: ICD-10-CM

## 2024-03-18 ENCOUNTER — TELEPHONE (OUTPATIENT)
Age: 44
End: 2024-03-18

## 2024-03-18 ENCOUNTER — PATIENT MESSAGE (OUTPATIENT)
Dept: OBGYN CLINIC | Age: 44
End: 2024-03-18

## 2024-03-18 DIAGNOSIS — Z01.818 PRE-OP EXAM: Primary | ICD-10-CM

## 2024-03-18 RX ORDER — MELOXICAM 15 MG/1
TABLET ORAL
Qty: 90 TABLET | Refills: 3 | Status: SHIPPED | OUTPATIENT
Start: 2024-03-18

## 2024-03-18 RX ORDER — NORETHINDRONE ACETATE AND ETHINYL ESTRADIOL 1MG-20(21)
1 KIT ORAL DAILY
Qty: 3 PACKET | Refills: 2 | OUTPATIENT
Start: 2024-03-18

## 2024-03-18 NOTE — TELEPHONE ENCOUNTER
Lauren has been scheduled for sx on 4/11.  Instructions were given to her at the time of booking.  I called today and left a message with date/time/location of sx and reminded her to do PAT 10-14 days before at the ER.  I also told her the date/time/location of her first post op appointment.  She may call back with questions or she can change the post op if she wants.

## 2024-03-19 NOTE — TELEPHONE ENCOUNTER
Please call pt she scheduled on line She needs sooner appt as that would be 1.5 years since last visit.  Please get her scheduled sooner for appt refill.  If we can get her in within 30 days Ill send her a 1 month refill

## 2024-03-19 NOTE — TELEPHONE ENCOUNTER
From: Lauren Monte  Sent: 3/19/2024 8:45 AM EDT  To: [facility] Ob/Gyn Clinical Support Pool  Subject: Birth control refill    I just scheduled an appointment through my chart and the first available is not until June. Does that mean that? I can't have the medication until June?

## 2024-03-20 RX ORDER — NORETHINDRONE ACETATE AND ETHINYL ESTRADIOL 1MG-20(21)
1 KIT ORAL DAILY
Qty: 1 PACKET | Refills: 0 | Status: SHIPPED | OUTPATIENT
Start: 2024-03-20

## 2024-03-26 ENCOUNTER — OFFICE VISIT (OUTPATIENT)
Dept: OBGYN CLINIC | Age: 44
End: 2024-03-26
Payer: COMMERCIAL

## 2024-03-26 VITALS
HEIGHT: 62 IN | WEIGHT: 237 LBS | DIASTOLIC BLOOD PRESSURE: 80 MMHG | SYSTOLIC BLOOD PRESSURE: 128 MMHG | BODY MASS INDEX: 43.61 KG/M2

## 2024-03-26 DIAGNOSIS — R92.8 ABNORMAL MAMMOGRAM OF LEFT BREAST: ICD-10-CM

## 2024-03-26 DIAGNOSIS — R10.2 PELVIC PAIN: ICD-10-CM

## 2024-03-26 DIAGNOSIS — Z30.41 ENCOUNTER FOR SURVEILLANCE OF CONTRACEPTIVE PILLS: ICD-10-CM

## 2024-03-26 DIAGNOSIS — I10 ESSENTIAL HYPERTENSION: ICD-10-CM

## 2024-03-26 DIAGNOSIS — N92.0 MENORRHAGIA WITH REGULAR CYCLE: Primary | ICD-10-CM

## 2024-03-26 PROCEDURE — 99214 OFFICE O/P EST MOD 30 MIN: CPT | Performed by: NURSE PRACTITIONER

## 2024-03-26 PROCEDURE — 3079F DIAST BP 80-89 MM HG: CPT | Performed by: NURSE PRACTITIONER

## 2024-03-26 PROCEDURE — 3074F SYST BP LT 130 MM HG: CPT | Performed by: NURSE PRACTITIONER

## 2024-03-26 RX ORDER — NORETHINDRONE ACETATE AND ETHINYL ESTRADIOL 1MG-20(21)
1 KIT ORAL DAILY
Qty: 3 PACKET | Refills: 3 | Status: SHIPPED | OUTPATIENT
Start: 2024-03-26

## 2024-03-26 SDOH — ECONOMIC STABILITY: FOOD INSECURITY: WITHIN THE PAST 12 MONTHS, YOU WORRIED THAT YOUR FOOD WOULD RUN OUT BEFORE YOU GOT MONEY TO BUY MORE.: NEVER TRUE

## 2024-03-26 SDOH — ECONOMIC STABILITY: FOOD INSECURITY: WITHIN THE PAST 12 MONTHS, THE FOOD YOU BOUGHT JUST DIDN'T LAST AND YOU DIDN'T HAVE MONEY TO GET MORE.: NEVER TRUE

## 2024-03-26 SDOH — ECONOMIC STABILITY: INCOME INSECURITY: HOW HARD IS IT FOR YOU TO PAY FOR THE VERY BASICS LIKE FOOD, HOUSING, MEDICAL CARE, AND HEATING?: NOT HARD AT ALL

## 2024-03-26 ASSESSMENT — PATIENT HEALTH QUESTIONNAIRE - PHQ9
8. MOVING OR SPEAKING SO SLOWLY THAT OTHER PEOPLE COULD HAVE NOTICED. OR THE OPPOSITE - BEING SO FIDGETY OR RESTLESS THAT YOU HAVE BEEN MOVING AROUND A LOT MORE THAN USUAL: NOT AT ALL
3. TROUBLE FALLING OR STAYING ASLEEP: NOT AT ALL
9. THOUGHTS THAT YOU WOULD BE BETTER OFF DEAD, OR OF HURTING YOURSELF: NOT AT ALL
SUM OF ALL RESPONSES TO PHQ9 QUESTIONS 1 & 2: 0
4. FEELING TIRED OR HAVING LITTLE ENERGY: NOT AT ALL
2. FEELING DOWN, DEPRESSED OR HOPELESS: NOT AT ALL
2. FEELING DOWN, DEPRESSED OR HOPELESS: NOT AT ALL
5. POOR APPETITE OR OVEREATING: NOT AT ALL
SUM OF ALL RESPONSES TO PHQ QUESTIONS 1-9: 0
SUM OF ALL RESPONSES TO PHQ QUESTIONS 1-9: 0
1. LITTLE INTEREST OR PLEASURE IN DOING THINGS: NOT AT ALL
7. TROUBLE CONCENTRATING ON THINGS, SUCH AS READING THE NEWSPAPER OR WATCHING TELEVISION: NOT AT ALL
6. FEELING BAD ABOUT YOURSELF - OR THAT YOU ARE A FAILURE OR HAVE LET YOURSELF OR YOUR FAMILY DOWN: NOT AT ALL
10. IF YOU CHECKED OFF ANY PROBLEMS, HOW DIFFICULT HAVE THESE PROBLEMS MADE IT FOR YOU TO DO YOUR WORK, TAKE CARE OF THINGS AT HOME, OR GET ALONG WITH OTHER PEOPLE: NOT DIFFICULT AT ALL
6. FEELING BAD ABOUT YOURSELF - OR THAT YOU ARE A FAILURE OR HAVE LET YOURSELF OR YOUR FAMILY DOWN: NOT AT ALL
3. TROUBLE FALLING OR STAYING ASLEEP: NOT AT ALL
1. LITTLE INTEREST OR PLEASURE IN DOING THINGS: NOT AT ALL
10. IF YOU CHECKED OFF ANY PROBLEMS, HOW DIFFICULT HAVE THESE PROBLEMS MADE IT FOR YOU TO DO YOUR WORK, TAKE CARE OF THINGS AT HOME, OR GET ALONG WITH OTHER PEOPLE: NOT DIFFICULT AT ALL
7. TROUBLE CONCENTRATING ON THINGS, SUCH AS READING THE NEWSPAPER OR WATCHING TELEVISION: NOT AT ALL
9. THOUGHTS THAT YOU WOULD BE BETTER OFF DEAD, OR OF HURTING YOURSELF: NOT AT ALL
SUM OF ALL RESPONSES TO PHQ QUESTIONS 1-9: 0
4. FEELING TIRED OR HAVING LITTLE ENERGY: NOT AT ALL
5. POOR APPETITE OR OVEREATING: NOT AT ALL
SUM OF ALL RESPONSES TO PHQ QUESTIONS 1-9: 0
SUM OF ALL RESPONSES TO PHQ QUESTIONS 1-9: 0
8. MOVING OR SPEAKING SO SLOWLY THAT OTHER PEOPLE COULD HAVE NOTICED. OR THE OPPOSITE, BEING SO FIGETY OR RESTLESS THAT YOU HAVE BEEN MOVING AROUND A LOT MORE THAN USUAL: NOT AT ALL

## 2024-03-26 NOTE — PROGRESS NOTES
list reviewed as indicated in this encounter.    Objective:  Vitals:  Blood pressure 128/80, height 1.575 m (5' 2\"), weight 107.5 kg (237 lb), last menstrual period 03/17/2024, not currently breastfeeding.  Constitutional: She is oriented to person, place, and time.  She appears well-developed and well-nourished and in no acute distress.   Cardiovascular: Normal rate and regular rhythm, no murmur, rub, or gallop    Pulmonary/Chest: Effort normal and breath sounds normal. No rales or wheezes.  No chest retraction.  Abdomen: soft, non tender  Extremities: no clubbing, cyanosis, or edema  Neurological:  CN II - XII grossly intact; no focal neurological deficits  Psychiatric:  Well groomed, well dressed.  The patient maintains appropriate eye contact and does not appear to be responding to internal stimuli.  No agitation    Assessment/ Plan / Medical Decision Making   Diagnosis Orders   1. Menorrhagia with regular cycle  norethindrone-ethinyl estradiol (LOESTRIN FE 1/20) 1-20 MG-MCG per tablet      2. Pelvic pain  norethindrone-ethinyl estradiol (LOESTRIN FE 1/20) 1-20 MG-MCG per tablet      3. Abnormal mammogram of left breast        4. Encounter for surveillance of contraceptive pills                Medications, laboratory testing, imaging, consultation, and follow up as documented in this encounter.    Heavy periods, pelvic pain-  Overall manageable on OCP although would be interested in trailing OCP continuously. Informed take as directed may get BTB.   Blood pressure normal /81 on prinzide for hx HTN  Prescription sent   Cont. Oral contraceptive.  Call with any unusual bleeding, pain, discharge.  DVT signs and symptoms reviewed with patient.    Hx abnormal mammogram left breast   Mammogram in October 2023- negative evidence malignancy, return annual mammogram     Follow up for annual as scheduled sooner if needed     Lauren received counseling on the following healthy behaviors: medication adherence    Patient

## 2024-04-01 ENCOUNTER — HOSPITAL ENCOUNTER (OUTPATIENT)
Age: 44
Discharge: HOME | End: 2024-04-01
Payer: COMMERCIAL

## 2024-04-01 DIAGNOSIS — I10 ESSENTIAL HYPERTENSION: ICD-10-CM

## 2024-04-01 DIAGNOSIS — Z01.818 PRE-OP EXAM: ICD-10-CM

## 2024-04-01 DIAGNOSIS — E03.9 HYPOTHYROIDISM, UNSPECIFIED TYPE: ICD-10-CM

## 2024-04-01 DIAGNOSIS — E78.2 MIXED HYPERLIPIDEMIA: ICD-10-CM

## 2024-04-01 DIAGNOSIS — E55.9 VITAMIN D DEFICIENCY: ICD-10-CM

## 2024-04-01 LAB
25(OH)D3 SERPL-MCNC: 70 NG/ML (ref 30–100)
ALBUMIN SERPL-MCNC: 4.6 G/DL (ref 3.5–5.2)
ALBUMIN/GLOB SERPL: 2 {RATIO} (ref 1–2.5)
ALP SERPL-CCNC: 36 U/L (ref 35–104)
ALT SERPL-CCNC: 7 U/L (ref 10–35)
ANION GAP SERPL CALCULATED.3IONS-SCNC: 13 MMOL/L (ref 9–16)
AST SERPL-CCNC: 18 U/L (ref 10–35)
BASOPHILS # BLD: 0.1 K/UL (ref 0–0.2)
BASOPHILS NFR BLD: 1 % (ref 0–2)
BILIRUB SERPL-MCNC: 0.4 MG/DL (ref 0–1.2)
BUN SERPL-MCNC: 10 MG/DL (ref 6–20)
CALCIUM SERPL-MCNC: 9.5 MG/DL (ref 8.6–10.4)
CHLORIDE SERPL-SCNC: 100 MMOL/L (ref 98–107)
CHOLEST SERPL-MCNC: 143 MG/DL (ref 0–199)
CHOLESTEROL/HDL RATIO: 3
CO2 SERPL-SCNC: 26 MMOL/L (ref 20–31)
CREAT SERPL-MCNC: 0.6 MG/DL (ref 0.5–0.9)
EKG ATRIAL RATE: 59 BPM
EKG P AXIS: 37 DEGREES
EKG P-R INTERVAL: 172 MS
EKG Q-T INTERVAL: 428 MS
EKG QRS DURATION: 84 MS
EKG QTC CALCULATION (BAZETT): 423 MS
EKG R AXIS: 47 DEGREES
EKG T AXIS: 39 DEGREES
EKG VENTRICULAR RATE: 59 BPM
EOSINOPHIL # BLD: 0.26 K/UL (ref 0–0.44)
EOSINOPHILS RELATIVE PERCENT: 3 % (ref 1–4)
ERYTHROCYTE [DISTWIDTH] IN BLOOD BY AUTOMATED COUNT: 13.7 % (ref 11.8–14.4)
GFR SERPL CREATININE-BSD FRML MDRD: >90 ML/MIN/1.73M2
GLUCOSE SERPL-MCNC: 96 MG/DL (ref 74–99)
HCT VFR BLD AUTO: 44.2 % (ref 36.3–47.1)
HDLC SERPL-MCNC: 52 MG/DL
HGB BLD-MCNC: 14.6 G/DL (ref 11.9–15.1)
IMM GRANULOCYTES # BLD AUTO: 0.05 K/UL (ref 0–0.3)
IMM GRANULOCYTES NFR BLD: 1 %
LDLC SERPL CALC-MCNC: 53 MG/DL (ref 0–100)
LYMPHOCYTES NFR BLD: 2.34 K/UL (ref 1.1–3.7)
LYMPHOCYTES RELATIVE PERCENT: 24 % (ref 24–43)
MCH RBC QN AUTO: 29.5 PG (ref 25.2–33.5)
MCHC RBC AUTO-ENTMCNC: 33 G/DL (ref 28.4–34.8)
MCV RBC AUTO: 89.3 FL (ref 82.6–102.9)
MONOCYTES NFR BLD: 0.55 K/UL (ref 0.1–1.2)
MONOCYTES NFR BLD: 6 % (ref 3–12)
NEUTROPHILS NFR BLD: 65 % (ref 36–65)
NEUTS SEG NFR BLD: 6.5 K/UL (ref 1.5–8.1)
NRBC BLD-RTO: 0 PER 100 WBC
PLATELET # BLD AUTO: 318 K/UL (ref 138–453)
PMV BLD AUTO: 11 FL (ref 8.1–13.5)
POTASSIUM SERPL-SCNC: 3.8 MMOL/L (ref 3.7–5.3)
PROT SERPL-MCNC: 7 G/DL (ref 6.6–8.7)
RBC # BLD AUTO: 4.95 M/UL (ref 3.95–5.11)
SODIUM SERPL-SCNC: 139 MMOL/L (ref 136–145)
T4 FREE SERPL-MCNC: 1 NG/DL (ref 0.92–1.68)
TRIGL SERPL-MCNC: 189 MG/DL
TSH SERPL DL<=0.05 MIU/L-ACNC: 3.12 UIU/ML (ref 0.27–4.2)
VLDLC SERPL CALC-MCNC: 38 MG/DL
WBC OTHER # BLD: 9.8 K/UL (ref 3.5–11.3)

## 2024-04-01 PROCEDURE — 93005 ELECTROCARDIOGRAM TRACING: CPT

## 2024-04-01 PROCEDURE — 36415 COLL VENOUS BLD VENIPUNCTURE: CPT

## 2024-04-01 PROCEDURE — 85025 COMPLETE CBC W/AUTO DIFF WBC: CPT

## 2024-04-01 PROCEDURE — 84439 ASSAY OF FREE THYROXINE: CPT

## 2024-04-01 PROCEDURE — 84443 ASSAY THYROID STIM HORMONE: CPT

## 2024-04-01 PROCEDURE — 93010 ELECTROCARDIOGRAM REPORT: CPT | Performed by: INTERNAL MEDICINE

## 2024-04-01 PROCEDURE — 80061 LIPID PANEL: CPT

## 2024-04-01 PROCEDURE — 82306 VITAMIN D 25 HYDROXY: CPT

## 2024-04-01 PROCEDURE — 80053 COMPREHEN METABOLIC PANEL: CPT

## 2024-04-03 NOTE — H&P
HPI    Physical Exam  There were no vitals taken for this visit.   General Appearance:  No apparent distress  Mental Status: Alert and oriented  Heart: Rate regular  Lungs: Respirations regular, no distress  Abdomen: Soft  Neurovascular: Palpable pulses        Diagnostics and Labs  Relevant diagnostic, laboratory and radiological studies have been reviewed in the Electronic Medical Record.    Assessment and Plan  Lauren Monte is a 44 y.o. old female with right wrist TFCC tear.  Plan for right wrist arthroscopy with possible TFCC debridement versus repair.

## 2024-04-03 NOTE — PROGRESS NOTES
DAY OF SURGERY/PROCEDURE  GUIDELINES    As a patient at the [facility], you can expect quality medical and nursing care that is centered on your individual needs. It is our goal to make your surgical experience as comfortable and excellent as possible.  ________________________________________________________________________    The following instructions are general guidelines, if any information on this sheet is different from what your doctor has instructed you to do, please follow your doctor's instructions.    Please arrive on 4/11 @ 800 am       Enter through entrance C. Check in at registration     Upon arrival you will be taken to the pre-operative area to get ready for surgery, your family will stay in the waiting room and visit with you once you are ready for surgery. Due to special limitations please limit visitation to 1-2 members of your family at a time. When it is time for surgery your family will return to the waiting room.    Nothing to eat, drink, smoke, suck or chew after midnight (no water, gum, mints, cigarettes, cigars, pipes, snuff, chewing tobacco, etc.) or your surgery may be canceled.     Take a shower or bath on the morning of your surgery/procedure (Hibiclens if directed) Do not apply any lotions.    Brush your teeth, but do not swallow any water    IN CASE OF ILLNESS - If you have a cold or flu symptoms (high fever, runny nose, sore throat, cough, etc.) rash, nausea, vomiting, loose stools, and/or recent contact with someone who has a contagious disease (chick pox, measles, etc.) please call your doctor before coming to the surgery center    Take a small sip of water with heart, blood pressure, and/or seizure medication the morning of surgery. Lisinopril/hctz,synthroid    DO NOT take anticoagulants (blood thinners, aspirin or aspirin-containing products) as instructed by your physician.    Leave all jewelry at home and wear loose, comfortable clothing that is easy to

## 2024-04-10 ENCOUNTER — ANESTHESIA EVENT (OUTPATIENT)
Dept: OPERATING ROOM | Age: 44
End: 2024-04-10
Payer: COMMERCIAL

## 2024-04-11 ENCOUNTER — ANESTHESIA (OUTPATIENT)
Dept: OPERATING ROOM | Age: 44
End: 2024-04-11
Payer: COMMERCIAL

## 2024-04-11 ENCOUNTER — APPOINTMENT (OUTPATIENT)
Dept: GENERAL RADIOLOGY | Age: 44
End: 2024-04-11
Attending: ORTHOPAEDIC SURGERY
Payer: COMMERCIAL

## 2024-04-11 ENCOUNTER — HOSPITAL ENCOUNTER (OUTPATIENT)
Age: 44
Setting detail: OUTPATIENT SURGERY
Discharge: HOME | End: 2024-04-11
Attending: ORTHOPAEDIC SURGERY | Admitting: ORTHOPAEDIC SURGERY
Payer: COMMERCIAL

## 2024-04-11 VITALS
DIASTOLIC BLOOD PRESSURE: 77 MMHG | HEART RATE: 70 BPM | OXYGEN SATURATION: 94 % | WEIGHT: 233 LBS | BODY MASS INDEX: 42.88 KG/M2 | HEIGHT: 62 IN | TEMPERATURE: 97.5 F | RESPIRATION RATE: 12 BRPM | SYSTOLIC BLOOD PRESSURE: 139 MMHG

## 2024-04-11 DIAGNOSIS — G89.18 POST-OP PAIN: Primary | ICD-10-CM

## 2024-04-11 PROBLEM — M25.531 PAIN IN RIGHT WRIST: Status: ACTIVE | Noted: 2024-04-11

## 2024-04-11 PROCEDURE — 7100000010 HC PHASE II RECOVERY - FIRST 15 MIN: Performed by: ORTHOPAEDIC SURGERY

## 2024-04-11 PROCEDURE — 2720000010 HC SURG SUPPLY STERILE: Performed by: ORTHOPAEDIC SURGERY

## 2024-04-11 PROCEDURE — 2580000003 HC RX 258: Performed by: ANESTHESIOLOGY

## 2024-04-11 PROCEDURE — 6360000002 HC RX W HCPCS: Performed by: ANESTHESIOLOGY

## 2024-04-11 PROCEDURE — 3600000014 HC SURGERY LEVEL 4 ADDTL 15MIN: Performed by: ORTHOPAEDIC SURGERY

## 2024-04-11 PROCEDURE — 3700000000 HC ANESTHESIA ATTENDED CARE: Performed by: ORTHOPAEDIC SURGERY

## 2024-04-11 PROCEDURE — 6370000000 HC RX 637 (ALT 250 FOR IP)

## 2024-04-11 PROCEDURE — 6360000002 HC RX W HCPCS

## 2024-04-11 PROCEDURE — 64417 NJX AA&/STRD AX NERVE IMG: CPT | Performed by: ANESTHESIOLOGY

## 2024-04-11 PROCEDURE — 7100000000 HC PACU RECOVERY - FIRST 15 MIN: Performed by: ORTHOPAEDIC SURGERY

## 2024-04-11 PROCEDURE — 7100000001 HC PACU RECOVERY - ADDTL 15 MIN: Performed by: ORTHOPAEDIC SURGERY

## 2024-04-11 PROCEDURE — 7100000011 HC PHASE II RECOVERY - ADDTL 15 MIN: Performed by: ORTHOPAEDIC SURGERY

## 2024-04-11 PROCEDURE — 3700000001 HC ADD 15 MINUTES (ANESTHESIA): Performed by: ORTHOPAEDIC SURGERY

## 2024-04-11 PROCEDURE — 29846 WRIST ARTHROSCOPY/SURGERY: CPT | Performed by: ORTHOPAEDIC SURGERY

## 2024-04-11 PROCEDURE — 3600000004 HC SURGERY LEVEL 4 BASE: Performed by: ORTHOPAEDIC SURGERY

## 2024-04-11 PROCEDURE — 2709999900 HC NON-CHARGEABLE SUPPLY: Performed by: ORTHOPAEDIC SURGERY

## 2024-04-11 PROCEDURE — 2500000003 HC RX 250 WO HCPCS

## 2024-04-11 RX ORDER — FENTANYL CITRATE 50 UG/ML
INJECTION, SOLUTION INTRAMUSCULAR; INTRAVENOUS PRN
Status: DISCONTINUED | OUTPATIENT
Start: 2024-04-11 | End: 2024-04-11 | Stop reason: SDUPTHER

## 2024-04-11 RX ORDER — DIPHENHYDRAMINE HYDROCHLORIDE 50 MG/ML
INJECTION INTRAMUSCULAR; INTRAVENOUS PRN
Status: DISCONTINUED | OUTPATIENT
Start: 2024-04-11 | End: 2024-04-11 | Stop reason: SDUPTHER

## 2024-04-11 RX ORDER — ONDANSETRON 4 MG/1
8 TABLET, FILM COATED ORAL EVERY 8 HOURS PRN
Qty: 12 TABLET | Refills: 0 | Status: SHIPPED | OUTPATIENT
Start: 2024-04-11

## 2024-04-11 RX ORDER — SODIUM CHLORIDE 9 MG/ML
INJECTION, SOLUTION INTRAVENOUS PRN
Status: DISCONTINUED | OUTPATIENT
Start: 2024-04-11 | End: 2024-04-11 | Stop reason: HOSPADM

## 2024-04-11 RX ORDER — DEXAMETHASONE SODIUM PHOSPHATE 10 MG/ML
INJECTION, SOLUTION INTRAMUSCULAR; INTRAVENOUS
Status: COMPLETED | OUTPATIENT
Start: 2024-04-11 | End: 2024-04-11

## 2024-04-11 RX ORDER — BUPIVACAINE HYDROCHLORIDE 2.5 MG/ML
INJECTION, SOLUTION EPIDURAL; INFILTRATION; INTRACAUDAL
Status: COMPLETED
Start: 2024-04-11 | End: 2024-04-11

## 2024-04-11 RX ORDER — SODIUM CHLORIDE 0.9 % (FLUSH) 0.9 %
5-40 SYRINGE (ML) INJECTION PRN
Status: DISCONTINUED | OUTPATIENT
Start: 2024-04-11 | End: 2024-04-11 | Stop reason: HOSPADM

## 2024-04-11 RX ORDER — FENTANYL CITRATE 50 UG/ML
INJECTION, SOLUTION INTRAMUSCULAR; INTRAVENOUS
Status: COMPLETED
Start: 2024-04-11 | End: 2024-04-11

## 2024-04-11 RX ORDER — HYDROCODONE BITARTRATE AND ACETAMINOPHEN 5; 325 MG/1; MG/1
TABLET ORAL
Status: COMPLETED
Start: 2024-04-11 | End: 2024-04-11

## 2024-04-11 RX ORDER — PHENYLEPHRINE HCL IN 0.9% NACL 1 MG/10 ML
SYRINGE (ML) INTRAVENOUS PRN
Status: DISCONTINUED | OUTPATIENT
Start: 2024-04-11 | End: 2024-04-11 | Stop reason: SDUPTHER

## 2024-04-11 RX ORDER — HYDROCODONE BITARTRATE AND ACETAMINOPHEN 5; 325 MG/1; MG/1
1-2 TABLET ORAL EVERY 4 HOURS PRN
Qty: 25 TABLET | Refills: 0 | Status: SHIPPED | OUTPATIENT
Start: 2024-04-11 | End: 2024-04-15 | Stop reason: SDUPTHER

## 2024-04-11 RX ORDER — CEFAZOLIN 2 G/1
INJECTION, POWDER, FOR SOLUTION INTRAMUSCULAR; INTRAVENOUS
Status: COMPLETED
Start: 2024-04-11 | End: 2024-04-11

## 2024-04-11 RX ORDER — SODIUM CHLORIDE 0.9 % (FLUSH) 0.9 %
5-40 SYRINGE (ML) INJECTION EVERY 12 HOURS SCHEDULED
Status: DISCONTINUED | OUTPATIENT
Start: 2024-04-11 | End: 2024-04-11 | Stop reason: HOSPADM

## 2024-04-11 RX ORDER — METOCLOPRAMIDE HYDROCHLORIDE 5 MG/ML
10 INJECTION INTRAMUSCULAR; INTRAVENOUS
Status: DISCONTINUED | OUTPATIENT
Start: 2024-04-11 | End: 2024-04-11 | Stop reason: HOSPADM

## 2024-04-11 RX ORDER — GLYCOPYRROLATE 0.2 MG/ML
INJECTION INTRAMUSCULAR; INTRAVENOUS PRN
Status: DISCONTINUED | OUTPATIENT
Start: 2024-04-11 | End: 2024-04-11 | Stop reason: SDUPTHER

## 2024-04-11 RX ORDER — MEPERIDINE HYDROCHLORIDE 50 MG/ML
12.5 INJECTION INTRAMUSCULAR; INTRAVENOUS; SUBCUTANEOUS ONCE
Status: DISCONTINUED | OUTPATIENT
Start: 2024-04-11 | End: 2024-04-11 | Stop reason: HOSPADM

## 2024-04-11 RX ORDER — BUPIVACAINE HYDROCHLORIDE 2.5 MG/ML
INJECTION, SOLUTION EPIDURAL; INFILTRATION; INTRACAUDAL
Status: COMPLETED | OUTPATIENT
Start: 2024-04-11 | End: 2024-04-11

## 2024-04-11 RX ORDER — OXYCODONE HYDROCHLORIDE 5 MG/1
10 TABLET ORAL PRN
Status: DISCONTINUED | OUTPATIENT
Start: 2024-04-11 | End: 2024-04-11 | Stop reason: HOSPADM

## 2024-04-11 RX ORDER — PROPOFOL 10 MG/ML
INJECTION, EMULSION INTRAVENOUS PRN
Status: DISCONTINUED | OUTPATIENT
Start: 2024-04-11 | End: 2024-04-11 | Stop reason: SDUPTHER

## 2024-04-11 RX ORDER — IBUPROFEN 800 MG/1
800 TABLET ORAL EVERY 8 HOURS PRN
Qty: 90 TABLET | Refills: 0 | Status: SHIPPED | OUTPATIENT
Start: 2024-04-11

## 2024-04-11 RX ORDER — OXYCODONE HYDROCHLORIDE 5 MG/1
5 TABLET ORAL PRN
Status: DISCONTINUED | OUTPATIENT
Start: 2024-04-11 | End: 2024-04-11 | Stop reason: HOSPADM

## 2024-04-11 RX ORDER — MIDAZOLAM HYDROCHLORIDE 1 MG/ML
INJECTION INTRAMUSCULAR; INTRAVENOUS
Status: COMPLETED
Start: 2024-04-11 | End: 2024-04-11

## 2024-04-11 RX ORDER — SODIUM CHLORIDE, SODIUM LACTATE, POTASSIUM CHLORIDE, CALCIUM CHLORIDE 600; 310; 30; 20 MG/100ML; MG/100ML; MG/100ML; MG/100ML
INJECTION, SOLUTION INTRAVENOUS CONTINUOUS
Status: DISCONTINUED | OUTPATIENT
Start: 2024-04-11 | End: 2024-04-11 | Stop reason: HOSPADM

## 2024-04-11 RX ORDER — NALOXONE HYDROCHLORIDE 0.4 MG/ML
INJECTION, SOLUTION INTRAMUSCULAR; INTRAVENOUS; SUBCUTANEOUS PRN
Status: DISCONTINUED | OUTPATIENT
Start: 2024-04-11 | End: 2024-04-11 | Stop reason: HOSPADM

## 2024-04-11 RX ORDER — MORPHINE SULFATE 2 MG/ML
1 INJECTION, SOLUTION INTRAMUSCULAR; INTRAVENOUS EVERY 5 MIN PRN
Status: DISCONTINUED | OUTPATIENT
Start: 2024-04-11 | End: 2024-04-11 | Stop reason: HOSPADM

## 2024-04-11 RX ORDER — ONDANSETRON 2 MG/ML
4 INJECTION INTRAMUSCULAR; INTRAVENOUS
Status: DISCONTINUED | OUTPATIENT
Start: 2024-04-11 | End: 2024-04-11 | Stop reason: HOSPADM

## 2024-04-11 RX ORDER — DEXAMETHASONE SODIUM PHOSPHATE 10 MG/ML
INJECTION, SOLUTION INTRAMUSCULAR; INTRAVENOUS PRN
Status: DISCONTINUED | OUTPATIENT
Start: 2024-04-11 | End: 2024-04-11 | Stop reason: SDUPTHER

## 2024-04-11 RX ORDER — HYDRALAZINE HYDROCHLORIDE 20 MG/ML
10 INJECTION INTRAMUSCULAR; INTRAVENOUS
Status: DISCONTINUED | OUTPATIENT
Start: 2024-04-11 | End: 2024-04-11 | Stop reason: HOSPADM

## 2024-04-11 RX ORDER — LIDOCAINE HYDROCHLORIDE 10 MG/ML
INJECTION, SOLUTION INFILTRATION; PERINEURAL PRN
Status: DISCONTINUED | OUTPATIENT
Start: 2024-04-11 | End: 2024-04-11 | Stop reason: SDUPTHER

## 2024-04-11 RX ORDER — KETOROLAC TROMETHAMINE 30 MG/ML
INJECTION, SOLUTION INTRAMUSCULAR; INTRAVENOUS PRN
Status: DISCONTINUED | OUTPATIENT
Start: 2024-04-11 | End: 2024-04-11 | Stop reason: SDUPTHER

## 2024-04-11 RX ORDER — MIDAZOLAM HYDROCHLORIDE 2 MG/2ML
2 INJECTION, SOLUTION INTRAMUSCULAR; INTRAVENOUS
Status: DISCONTINUED | OUTPATIENT
Start: 2024-04-11 | End: 2024-04-11 | Stop reason: HOSPADM

## 2024-04-11 RX ORDER — ONDANSETRON 2 MG/ML
INJECTION INTRAMUSCULAR; INTRAVENOUS PRN
Status: DISCONTINUED | OUTPATIENT
Start: 2024-04-11 | End: 2024-04-11 | Stop reason: SDUPTHER

## 2024-04-11 RX ORDER — ROCURONIUM BROMIDE 10 MG/ML
INJECTION, SOLUTION INTRAVENOUS PRN
Status: DISCONTINUED | OUTPATIENT
Start: 2024-04-11 | End: 2024-04-11 | Stop reason: SDUPTHER

## 2024-04-11 RX ORDER — CEFAZOLIN SODIUM 1 G/3ML
INJECTION, POWDER, FOR SOLUTION INTRAMUSCULAR; INTRAVENOUS PRN
Status: DISCONTINUED | OUTPATIENT
Start: 2024-04-11 | End: 2024-04-11 | Stop reason: SDUPTHER

## 2024-04-11 RX ORDER — DIPHENHYDRAMINE HYDROCHLORIDE 50 MG/ML
12.5 INJECTION INTRAMUSCULAR; INTRAVENOUS
Status: DISCONTINUED | OUTPATIENT
Start: 2024-04-11 | End: 2024-04-11 | Stop reason: HOSPADM

## 2024-04-11 RX ORDER — SEVOFLURANE 250 ML/250ML
LIQUID RESPIRATORY (INHALATION)
Status: DISCONTINUED
Start: 2024-04-11 | End: 2024-04-11 | Stop reason: HOSPADM

## 2024-04-11 RX ORDER — SODIUM CHLORIDE 9 MG/ML
INJECTION, SOLUTION INTRAVENOUS CONTINUOUS
Status: DISCONTINUED | OUTPATIENT
Start: 2024-04-11 | End: 2024-04-11 | Stop reason: HOSPADM

## 2024-04-11 RX ORDER — DEXAMETHASONE SODIUM PHOSPHATE 10 MG/ML
INJECTION INTRAMUSCULAR; INTRAVENOUS
Status: COMPLETED
Start: 2024-04-11 | End: 2024-04-11

## 2024-04-11 RX ORDER — LABETALOL HYDROCHLORIDE 5 MG/ML
10 INJECTION, SOLUTION INTRAVENOUS
Status: DISCONTINUED | OUTPATIENT
Start: 2024-04-11 | End: 2024-04-11 | Stop reason: HOSPADM

## 2024-04-11 RX ORDER — HYDROCODONE BITARTRATE AND ACETAMINOPHEN 5; 325 MG/1; MG/1
1 TABLET ORAL ONCE
Status: COMPLETED | OUTPATIENT
Start: 2024-04-11 | End: 2024-04-11

## 2024-04-11 RX ADMIN — ROCURONIUM BROMIDE 40 MG: 50 INJECTION INTRAVENOUS at 09:40

## 2024-04-11 RX ADMIN — SUGAMMADEX 100 MG: 100 INJECTION, SOLUTION INTRAVENOUS at 11:18

## 2024-04-11 RX ADMIN — Medication 100 MCG: at 11:00

## 2024-04-11 RX ADMIN — FENTANYL CITRATE 50 MCG: 50 INJECTION, SOLUTION INTRAMUSCULAR; INTRAVENOUS at 09:40

## 2024-04-11 RX ADMIN — FENTANYL CITRATE 100 MCG: 50 INJECTION INTRAMUSCULAR; INTRAVENOUS at 09:00

## 2024-04-11 RX ADMIN — HYDROCODONE BITARTRATE AND ACETAMINOPHEN 1 TABLET: 5; 325 TABLET ORAL at 11:55

## 2024-04-11 RX ADMIN — PROPOFOL 200 MG: 10 INJECTION, EMULSION INTRAVENOUS at 09:40

## 2024-04-11 RX ADMIN — FENTANYL CITRATE 25 MCG: 50 INJECTION, SOLUTION INTRAMUSCULAR; INTRAVENOUS at 11:11

## 2024-04-11 RX ADMIN — FENTANYL CITRATE 25 MCG: 50 INJECTION, SOLUTION INTRAMUSCULAR; INTRAVENOUS at 11:14

## 2024-04-11 RX ADMIN — LIDOCAINE HYDROCHLORIDE 40 MG: 10 INJECTION, SOLUTION INFILTRATION; PERINEURAL at 09:40

## 2024-04-11 RX ADMIN — Medication 100 MCG: at 10:46

## 2024-04-11 RX ADMIN — DIPHENHYDRAMINE HYDROCHLORIDE 12.5 MG: 50 INJECTION INTRAMUSCULAR; INTRAVENOUS at 09:47

## 2024-04-11 RX ADMIN — BUPIVACAINE HYDROCHLORIDE 30 ML: 2.5 INJECTION, SOLUTION EPIDURAL; INFILTRATION; INTRACAUDAL; PERINEURAL at 09:02

## 2024-04-11 RX ADMIN — CEFAZOLIN 2 G: 2 INJECTION, POWDER, FOR SOLUTION INTRAMUSCULAR; INTRAVENOUS at 09:47

## 2024-04-11 RX ADMIN — GLYCOPYRROLATE 0.3 MG: 0.2 INJECTION INTRAMUSCULAR; INTRAVENOUS at 10:19

## 2024-04-11 RX ADMIN — Medication 100 MCG: at 10:26

## 2024-04-11 RX ADMIN — MIDAZOLAM HYDROCHLORIDE 2 MG: 1 INJECTION, SOLUTION INTRAMUSCULAR; INTRAVENOUS at 09:00

## 2024-04-11 RX ADMIN — DEXAMETHASONE SODIUM PHOSPHATE 10 MG: 10 INJECTION, SOLUTION INTRAMUSCULAR; INTRAVENOUS at 09:47

## 2024-04-11 RX ADMIN — Medication 100 MCG: at 10:21

## 2024-04-11 RX ADMIN — Medication 100 MCG: at 10:34

## 2024-04-11 RX ADMIN — SODIUM CHLORIDE, POTASSIUM CHLORIDE, SODIUM LACTATE AND CALCIUM CHLORIDE: 600; 310; 30; 20 INJECTION, SOLUTION INTRAVENOUS at 11:14

## 2024-04-11 RX ADMIN — SUGAMMADEX 100 MG: 100 INJECTION, SOLUTION INTRAVENOUS at 11:19

## 2024-04-11 RX ADMIN — SODIUM CHLORIDE, POTASSIUM CHLORIDE, SODIUM LACTATE AND CALCIUM CHLORIDE: 600; 310; 30; 20 INJECTION, SOLUTION INTRAVENOUS at 09:36

## 2024-04-11 RX ADMIN — DEXAMETHASONE SODIUM PHOSPHATE 8 MG: 10 INJECTION, SOLUTION INTRAMUSCULAR; INTRAVENOUS at 09:02

## 2024-04-11 RX ADMIN — ROCURONIUM BROMIDE 10 MG: 50 INJECTION INTRAVENOUS at 10:14

## 2024-04-11 RX ADMIN — ONDANSETRON 4 MG: 2 INJECTION INTRAMUSCULAR; INTRAVENOUS at 11:00

## 2024-04-11 RX ADMIN — KETOROLAC TROMETHAMINE 30 MG: 30 INJECTION, SOLUTION INTRAMUSCULAR; INTRAVENOUS at 11:00

## 2024-04-11 ASSESSMENT — PAIN DESCRIPTION - DESCRIPTORS
DESCRIPTORS: ACHING;PRESSURE;SORE
DESCRIPTORS: ACHING
DESCRIPTORS: ACHING;PRESSURE;SORE

## 2024-04-11 ASSESSMENT — PAIN DESCRIPTION - FREQUENCY
FREQUENCY: CONTINUOUS

## 2024-04-11 ASSESSMENT — PAIN DESCRIPTION - LOCATION
LOCATION: WRIST

## 2024-04-11 ASSESSMENT — PAIN SCALES - GENERAL
PAINLEVEL_OUTOF10: 6
PAINLEVEL_OUTOF10: 8
PAINLEVEL_OUTOF10: 4
PAINLEVEL_OUTOF10: 8

## 2024-04-11 ASSESSMENT — PAIN - FUNCTIONAL ASSESSMENT
PAIN_FUNCTIONAL_ASSESSMENT: 0-10
PAIN_FUNCTIONAL_ASSESSMENT: NONE - DENIES PAIN

## 2024-04-11 ASSESSMENT — PAIN DESCRIPTION - ORIENTATION
ORIENTATION: RIGHT
ORIENTATION: RIGHT

## 2024-04-11 ASSESSMENT — PAIN DESCRIPTION - PAIN TYPE: TYPE: CHRONIC PAIN

## 2024-04-11 NOTE — ANESTHESIA POSTPROCEDURE EVALUATION
Department of Anesthesiology  Postprocedure Note    Patient: Lauren Monte  MRN: [id number]  YOB: 1980  Date of evaluation: 4/11/2024    Procedure Summary       Date: 04/11/24 Room / Location: [address]    Anesthesia Start: 0936 Anesthesia Stop: 1127    Procedure: RIGHT WRIST ARTHROSCOPY TRIANGULAR FIBROCARTILAGE COMPLEX REPAIR (Right: Wrist) Diagnosis:       Tear of triangular fibrocartilage complex (TFCC) of right wrist      (Tear of triangular fibrocartilage complex (TFCC) of right wrist [S63.591A])    Surgeons: Kiko Dobbins MD Responsible Provider: Maye Lopez MD    Anesthesia Type: general ASA Status: 3            Anesthesia Type: No value filed.    Nelia Phase I: Nelia Score: 9    Nelia Phase II: Nelia Score: 10    Anesthesia Post Evaluation    Patient location during evaluation: PACU  Patient participation: complete - patient participated  Level of consciousness: awake and alert  Airway patency: patent  Nausea & Vomiting: no nausea and no vomiting  Cardiovascular status: blood pressure returned to baseline  Respiratory status: acceptable and room air  Hydration status: euvolemic  Pain management: adequate and satisfactory to patient    No notable events documented.

## 2024-04-11 NOTE — ANESTHESIA PRE PROCEDURE
Plan      general     ASA 3     (GA and axillary nerve block)  Induction: intravenous.    MIPS: Postoperative opioids intended and Prophylactic antiemetics administered.  Anesthetic plan and risks discussed with patient.      Plan discussed with CRNA.          Post-op pain plan if not by surgeon: single peripheral nerve block        Maye Lopez MD   4/11/2024

## 2024-04-11 NOTE — ANESTHESIA PROCEDURE NOTES
Peripheral Block    Patient location during procedure: pre-op  Reason for block: post-op pain management and at surgeon's request  Start time: 4/11/2024 9:02 AM  End time: 4/11/2024 9:08 AM  Staffing  Performed: anesthesiologist   Anesthesiologist: Maye Lopez MD  Performed by: Maye Lopez MD  Authorized by: Maye Lopez MD    Preanesthetic Checklist  Completed: patient identified, IV checked, site marked, risks and benefits discussed, surgical/procedural consents, equipment checked, pre-op evaluation, timeout performed, anesthesia consent given, oxygen available, monitors applied/VS acknowledged, fire risk safety assessment completed and verbalized and blood product R/B/A discussed and consented  Peripheral Block   Patient position: supine  Prep: ChloraPrep  Provider prep: mask and sterile gloves  Patient monitoring: cardiac monitor, continuous pulse ox, frequent blood pressure checks, IV access, oxygen and responsive to questions  Block type: Axillary  R axillary  Laterality: right  Injection technique: single-shot  Guidance: ultrasound guided  Local infiltration: bupivacaine  Infiltration strength: 0.25 %  Local infiltration: bupivacaine  Dose: 2 mL    Needle   Needle type: insulated echogenic nerve stimulator needle   Needle gauge: 20 G  Needle localization: ultrasound guidance  Needle insertion depth: 5 cm  Test dose: negative  Needle length: 10 cm  Assessment   Injection assessment: negative aspiration for heme, local visualized surrounding nerve on ultrasound, no intravascular symptoms and no paresthesia on injection  Paresthesia pain: none  Slow fractionated injection: yes  Hemodynamics: stable  Outcomes: uncomplicated and patient tolerated procedure well    Additional Notes  0.375% Bupivacaine 25 ml + Dexamethasone 8 mg for right axillary nerve block, 5 ml for musculocutaneous nerve block  Medications Administered  BUPivacaine (MARCAINE) PF injection 0.25% - Perineural   30 mL - 4/11/2024 9:02:00 AM  dexAMETHasone

## 2024-04-11 NOTE — OP NOTE
Operative Note      Patient: Lauren Monte  YOB: 1980  MRN: [id number]    Date of Procedure: 4/11/2024    Pre-Op Diagnosis Codes:     * Tear of triangular fibrocartilage complex (TFCC) of right wrist [S63.591A]    Post-Op Diagnosis: Same       Procedure(s):  RIGHT WRIST ARTHROSCOPY TRIANGULAR FIBROCARTILAGE COMPLEX REPAIR    Surgeon(s):  Kiko Dobbins MD    Assistant:   Physician Assistant: Gloria Cedillo PA-C    Anesthesia: General    Estimated Blood Loss (mL): Minimal    Complications: None    Specimens:   * No specimens in log *    Implants:  * No implants in log *      Drains: * No LDAs found *    Findings:  Infection Present At Time Of Surgery (PATOS) (choose all levels that have infection present):  No infection present  Other Findings: TFCC tear    Detailed Description of Procedure:   This is a 44-year-old female who has right wrist pain.  We have discussed with her the risks and benefits of this procedure and she has agreed with this today.  Patient was brought to the operating room placed in the supine position and received general anesthesia.  Her right upper extremities and prepped and draped sterile fashion.  Timeout was performed ensuring correct patient correct extremity and correct procedure.  Preoperative antibiotics were assured with 2 g of Ancef.    At this point I placed a 25-gauge needle into my 3-4 portal and inflated the joint capsule with some saline.  I then made a small stab incision here and placed in my camera.  I performed a diagnostic arthroscopy.  She did have a fair bit of synovitis particular on the ulnar side of the wrist.  I made a 3 4 working portal and came into the joint with my shaver.  I performed a gentle debridement of the synovitis.  The radial scaphoid articulation and radiolunate articulation are to be very healthy.  The scapholunate ligament look to be in good condition.  I then came over to the TFCC and there was no central tear however on the peripheral  Home

## 2024-04-11 NOTE — DISCHARGE INSTRUCTIONS
Activity-keep splint clean dry and intact until office follow-up.  Okay to move fingers.    Activity   You have had anesthesia today  Do not drive or operate heavy equipment, consume alcoholic beverages,  important decisions for 24 hours, or while you are taking pain medication:  Take your time changing positions today. You may feel light headed or dizzy if you move too quickly.   Continue your home medications as ordered by your physician.  Rest for the next 24 hours. Getting enough rest will help you recover.   No  heavy lifting or straining right after surgery.   No  strenuous activities as your doctor or other health care professional recommends. Examples of these might include weightlifting, running, or playing sports.   Ask your doctor when you can drive again.  You may bathe/shower, keeping bandage clean and dry after 48 hours    Diet   You can eat your normal diet when you feel well. You should start off with bland foods like chicken soup, toast, or yogurt. Then advance as tolerated.  Drink plenty of fluids (unless your doctor tells you not to). Your urine should be very lightly colored without a strong odor.  Try to avoid constipation and straining with bowel movements. You may want to increase fiber intake. If you have not had a bowel movement after a couple of days, ask your doctor about taking a stool softener.    Call your doctor now or seek immediate medical care if:    You have pain that does not get better after you take pain medicine.    You have a fever over 101°F.    You have chills    You have signs of infection, such as:   Increased pain, swelling, warmth, or redness.   Red streaks leading from the incision.   Pus draining from the incision.

## 2024-04-15 DIAGNOSIS — G89.18 POST-OP PAIN: ICD-10-CM

## 2024-04-15 RX ORDER — HYDROCODONE BITARTRATE AND ACETAMINOPHEN 5; 325 MG/1; MG/1
1-2 TABLET ORAL EVERY 6 HOURS PRN
Qty: 25 TABLET | Refills: 0 | Status: SHIPPED | OUTPATIENT
Start: 2024-04-15 | End: 2024-04-22

## 2024-04-24 ENCOUNTER — OFFICE VISIT (OUTPATIENT)
Age: 44
End: 2024-04-24

## 2024-04-24 VITALS — WEIGHT: 233 LBS | BODY MASS INDEX: 42.88 KG/M2 | HEIGHT: 62 IN

## 2024-04-24 DIAGNOSIS — Z48.89 POSTOPERATIVE VISIT: Primary | ICD-10-CM

## 2024-04-24 PROCEDURE — 99024 POSTOP FOLLOW-UP VISIT: CPT | Performed by: ORTHOPAEDIC SURGERY

## 2024-04-24 NOTE — PROGRESS NOTES
[facility] Orthopedics & Sports Medicine      [facility] PHYSICIANS [facility]  [facility] ORTHOPAEDICS AND SPORTS MEDICINE  [address]  [place] [address]     Surgery:    4/11/2024  Right Wrist Arthroscopy Triangular Fibrocartilage Complex Repair - Right    History of Present Illness:    This is a 44 y.o. female who presents to the clinic today for post op follow up for Right Wrist Arthroscopy Triangular Fibrocartilage Complex Repair - Right on 4/11/2024 .  Patient is 2 weeks postop from a right wrist TFCC repair.  She is doing well overall.  She states that she has had some numbness and tingling which was constant originally but now is more so intermittent.  She has been taking ibuprofen throughout the day and Norco at night.  She presents today for further evaluation.    Physical exam:  Right hand: Patient is able to flex and extend the fingers.  Her incision sites look good at this point.  There is no erythema, the incision sites.  There is no seepage or drainage.  No signs of infectious process.  She is tight in dorsiflexion and extension which is to be expected at this point.  She has tenderness to palpation along the incision site on the ulnar aspect of the wrist.  Sensation intact light touch.    Plan:  Patient is 2 weeks postop from right wrist TFCC repair.  She is doing well at this point.  At this point we will place her in a cock up wrist splint which she can come out of this for gentle range of motion including wrist flexion and extension.  We would like her to avoid supination pronation.  I did teach her some gentle scar tissue massage form around the incision sites today.  She continue taking the ibuprofen as needed for the pain as well as Norco at night and eventually wean off Norco.  We will see her back in 4 weeks.  The patient demonstrates understanding and is agreeable to the plan.    Attending Physician Statement   I, Kiko Dobbins MD, have seen and

## 2024-04-26 ENCOUNTER — OFFICE VISIT (OUTPATIENT)
Dept: FAMILY MEDICINE CLINIC | Age: 44
End: 2024-04-26
Payer: COMMERCIAL

## 2024-04-26 VITALS
WEIGHT: 235.2 LBS | SYSTOLIC BLOOD PRESSURE: 126 MMHG | HEART RATE: 70 BPM | DIASTOLIC BLOOD PRESSURE: 84 MMHG | TEMPERATURE: 98.1 F | OXYGEN SATURATION: 98 % | BODY MASS INDEX: 43.02 KG/M2

## 2024-04-26 DIAGNOSIS — Z79.899 ON STATIN THERAPY: ICD-10-CM

## 2024-04-26 DIAGNOSIS — E03.9 HYPOTHYROIDISM, UNSPECIFIED TYPE: ICD-10-CM

## 2024-04-26 DIAGNOSIS — I10 ESSENTIAL HYPERTENSION: Primary | ICD-10-CM

## 2024-04-26 DIAGNOSIS — E55.9 VITAMIN D DEFICIENCY: ICD-10-CM

## 2024-04-26 DIAGNOSIS — F32.A ANXIETY AND DEPRESSION: ICD-10-CM

## 2024-04-26 DIAGNOSIS — M51.36 BULGING LUMBAR DISC: ICD-10-CM

## 2024-04-26 DIAGNOSIS — E78.2 MIXED HYPERLIPIDEMIA: ICD-10-CM

## 2024-04-26 DIAGNOSIS — F41.9 ANXIETY AND DEPRESSION: ICD-10-CM

## 2024-04-26 PROCEDURE — 99214 OFFICE O/P EST MOD 30 MIN: CPT | Performed by: NURSE PRACTITIONER

## 2024-04-26 PROCEDURE — 3079F DIAST BP 80-89 MM HG: CPT | Performed by: NURSE PRACTITIONER

## 2024-04-26 PROCEDURE — 3074F SYST BP LT 130 MM HG: CPT | Performed by: NURSE PRACTITIONER

## 2024-04-26 ASSESSMENT — ENCOUNTER SYMPTOMS
RHINORRHEA: 0
DIARRHEA: 0
VOMITING: 0
ABDOMINAL PAIN: 0
CHEST TIGHTNESS: 0
NAUSEA: 0
COUGH: 0
CONSTIPATION: 0
SORE THROAT: 0
SHORTNESS OF BREATH: 0
ABDOMINAL DISTENTION: 0
BACK PAIN: 1

## 2024-04-26 NOTE — PROGRESS NOTES
sounds. No murmur heard.  Pulmonary:      Effort: Pulmonary effort is normal. No respiratory distress.      Breath sounds: Normal breath sounds.   Chest:      Chest wall: No tenderness.   Abdominal:      General: Bowel sounds are normal.      Palpations: Abdomen is soft.      Tenderness: There is no abdominal tenderness.   Musculoskeletal:         General: Normal range of motion.      Cervical back: Normal range of motion.      Right lower leg: No edema.      Left lower leg: No edema.   Skin:     General: Skin is warm and dry.      Findings: No rash.   Neurological:      Mental Status: She is alert and oriented to person, place, and time.   Psychiatric:         Mood and Affect: Mood normal.         Behavior: Behavior is cooperative.       ASSESSMENT:   Diagnosis Orders   1. Essential hypertension  CBC    Comprehensive Metabolic Panel    Lipid Panel      2. Mixed hyperlipidemia  CBC    Comprehensive Metabolic Panel    Lipid Panel      3. On statin therapy  CBC    Comprehensive Metabolic Panel    Lipid Panel      4. Vitamin D deficiency  Hemoglobin A1C    Vitamin D 25 Hydroxy      5. Hypothyroidism, unspecified type  TSH    T4, Free      6. Anxiety and depression        7. Bulging lumbar disc          PLAN:  1. Essential hypertension  - Stable: BP in office today noted; BP: 126/84    - Continue Lisinopril-HCTZ 20-12.5 mg daily as previously prescribed.  - Low sodium diet and routine exercise encouraged; advised to seek emergent tx if SBP>180 and/or DBP>110, any chest pain, h/a or vision changes.       2. Mixed hyperlipidemia  3. On statin therapy  - Stable: Continue to decrease, fats, carbohydrates, and engage in a healthier diet overall, as well as routine exercise (40 minutes of aerobic exercise- moderate to vigorous intensity three to four times a week).  - Will cont with low fat/chol diet and Lipitor 10 mg daily as ordered.      4. Vitamin D deficiency  - Stable: Continue increase foods with Vitamin D, which

## 2024-05-09 DIAGNOSIS — G89.18 POST-OP PAIN: Primary | ICD-10-CM

## 2024-05-09 RX ORDER — OXYCODONE HYDROCHLORIDE AND ACETAMINOPHEN 5; 325 MG/1; MG/1
1-2 TABLET ORAL EVERY 12 HOURS PRN
Qty: 25 TABLET | Refills: 0 | Status: SHIPPED | OUTPATIENT
Start: 2024-05-09 | End: 2024-05-16

## 2024-05-22 ENCOUNTER — OFFICE VISIT (OUTPATIENT)
Age: 44
End: 2024-05-22

## 2024-05-22 DIAGNOSIS — G89.18 POST-OP PAIN: Primary | ICD-10-CM

## 2024-05-22 PROCEDURE — 99024 POSTOP FOLLOW-UP VISIT: CPT | Performed by: ORTHOPAEDIC SURGERY

## 2024-05-22 RX ORDER — DOXYCYCLINE HYCLATE 100 MG
100 TABLET ORAL 2 TIMES DAILY
Qty: 20 TABLET | Refills: 0 | Status: SHIPPED | OUTPATIENT
Start: 2024-05-22 | End: 2024-06-01

## 2024-05-22 NOTE — PROGRESS NOTES
[facility] Orthopedics & Sports Medicine    [facility] Orthopaedics and Sports Medicine  [address] [place] [address], [address]  [place] [address]    Surgery:    4/11/2024  Right Wrist Arthroscopy Triangular Fibrocartilage Complex Repair - Right    History of Present Illness:    This is a 44 y.o. female who presents to the clinic today for post op follow up for Right Wrist Arthroscopy Triangular Fibrocartilage Complex Repair - Right on 4/11/2024 .  She is doing well.  She does have still little bit of nerve sensitivity in the dorsal ulnar cutaneous nerve distribution.  Otherwise she can make a full clenched fist and her range of motion with flexion extension is appropriate.  Incisions are well-healed.  She did have a little bit of a suture abscess she stated over the last week but it does look like it is resolving.    At this point I am okay with advancing her in her therapy with more range of motion she can wean herself out of the brace.  I will give her some doxycycline for a week just to make sure the suture abscess calms down.  I will see her back in 4 to 6 weeks to check her progress at that time.          Electronically signed by Kiko Dobbins MD on 5/22/2024 at 11:10 AM

## 2024-05-29 ENCOUNTER — HOSPITAL ENCOUNTER (OUTPATIENT)
Dept: OCCUPATIONAL THERAPY | Facility: CLINIC | Age: 44
Setting detail: THERAPIES SERIES
Discharge: HOME | End: 2024-05-29
Payer: COMMERCIAL

## 2024-05-29 PROCEDURE — 97110 THERAPEUTIC EXERCISES: CPT

## 2024-05-29 PROCEDURE — 97165 OT EVAL LOW COMPLEX 30 MIN: CPT

## 2024-05-29 NOTE — CONSULTS
[] [facility]  Outpatient Rehabilitation &  Therapy  [address]  [phone]  F: [phone] [] [facility]  Outpatient Rehabilitation &  Therapy  [address]   [address]  P: [phone]  F: [phone] [x] [facility]  Outpatient Rehabilitation &  Therapy  [address]  P: [phone]  F: [phone] [] [facility]  Outpatient Rehabilitation &  Therapy  [address]   P: [phone]  F: [phone] [] [facility]   Outpatient Rehabilitation   & Therapy  [address]  P: [phone]   F: [phone]     Occupational Therapy Hand & Upper Extremity  Initial Evaluation    Date: 2024      Patient: Lauren Monte  : 1980  MRN: [id number]  Referring Provider:  Kiko Dobbins MD   Insurance: [facility] 35/40 visits remaining   Medical Diagnosis: G89.18 (ICD-10-CM) - Post-op pain    Rehab Codes: stiffness in wrist M25.63,, effusion of wrist M25.43,, or pain in right forearm M79.631,  Onset Date: 24    Next Dr. Appt: 7/3/24    Subjective:   CC: pain and limited wrist motion  HPI: Pt reports she is doing well. She states that MD wants her to slowly ween out of her brace. Pt reports she gets some pn w/ activity but overall she can do most of her ADLs/IADLs.    Mechanism of Injury: TFCC repair  Surgery Date:   Precautions: None    Involved Extremity: Right   Dominant Extremity: Right    Past Medical History: Unremarkable          Comorbidities: NA    Medications: Refer to Medical chart in Epic Allergies:  None    Pain: Intensity:   5/10 Location: R wrist      Pain Type: Intermittant and with movement/activity  Pain Altered Tx: no  Action Taken:none            Home Environment:    Pt lives in a  and Mobile [employer]+ [employer]  The washing machine is on and the main level    Vocation    Job Status [x]Normal duty []Light duty [] Off due to condition []Retired  []Not employed []Disability

## 2024-06-10 ENCOUNTER — HOSPITAL ENCOUNTER (OUTPATIENT)
Dept: OCCUPATIONAL THERAPY | Facility: CLINIC | Age: 44
Setting detail: THERAPIES SERIES
Discharge: HOME | End: 2024-06-10
Payer: COMMERCIAL

## 2024-06-10 NOTE — FLOWSHEET NOTE
[] [facility]       Occupational Therapy       [address]       Phone: [phone]       Fax: [phone] [x] [facility] Occupational  Therapy at [place]       [address]       Phone: [phone]       Fax: [phone] [] [facility]  Outpatient Rehabilitation &  Therapy  [address]   [address]  P: [phone]  F: [phone]      Occupational Therapy Cancel/No Show note    Date: 6/10/2024  Patient: Lauren Monte  : 1980  MRN: [id number]  Cancels/No Shows to date:     For today's appointment patient:  [x]  Cancelled  []  Rescheduled appointment  []  No-show     Reason given by patient:  []  Patient ill  []  Conflicting appointment  []  No transportation    []  Conflict with work  [x]  No reason given  []  Other:     Comments:    Electronically signed by: ELZIA Brady/ALEX

## 2024-06-12 ENCOUNTER — HOSPITAL ENCOUNTER (OUTPATIENT)
Dept: OCCUPATIONAL THERAPY | Facility: CLINIC | Age: 44
Setting detail: THERAPIES SERIES
Discharge: HOME | End: 2024-06-12
Payer: COMMERCIAL

## 2024-06-12 PROCEDURE — 97110 THERAPEUTIC EXERCISES: CPT

## 2024-06-12 NOTE — FLOWSHEET NOTE
[] [facility]  Outpatient Rehabilitation &  Therapy  [address]  [phone]  F: [phone] [] [facility]  Outpatient Rehabilitation &  Therapy  [address]   [address]  P: [phone]  F: [phone] [x] [facility]  Outpatient Rehabilitation &  Therapy  [address]  P: [phone]  F: [phone] [] [facility]  Outpatient Rehabilitation &  Therapy  [address]   P: [phone]  F: [phone] [] [facility]   Outpatient Rehabilitation   & Therapy  [address]  P: [phone]   F: [phone]     Occupational Therapy Daily Treatment Note    Date:  2024  Patient Name:  Lauren Monte    :  1980  MRN: [id number]  Referring Provider:  Kiko Dobbins MD   Insurance: [facility] 35/40 visits remaining   Medical Diagnosis: G89.18 (ICD-10-CM) - Post-op pain      Rehab Codes: stiffness in wrist M25.63,, effusion of wrist M25.43,, or pain in right forearm M79.631,  Onset Date: 24                 [facility]  Appt: 7/3/24    Visit# / total visits: ; Progress note for Medicare patient due at visit 8-10    Cancels/No Shows:       Subjective:    Pain:  [x] Yes  [] No Location: scar area Pain Rating: (0-10 scale) 5/10  Pain altered Tx:  [x] No  [] Yes  Action:  Pt Comments:  I sleep with may brace on, otherwise my hands and wrist is bent.      Precautions:  n/a  Surgery procedure and date:  s/p Right Wrist Arthroscopy Triangular Fibrocartilage Complex Repair  on 24.     Objective:  Today's Treatment:  Modalities:  Exercise Flow Sheet:  Exercise Reps/Time Weight/Level Comments   Wrist AROM 3x10 AROM HEP & reviewed   Pronation/supination  3x10 AROM HEP & reviewed    Putty gripping 3-5 mins Tan HEP & reviewed   digigrip     isolate  10x2 ea   red  added    Flex/Ext   10x2 1#  Added with forearm support     sponge transfer 2x    Added    Supination 10x2 1# dumbbell Added

## 2024-06-17 ENCOUNTER — HOSPITAL ENCOUNTER (OUTPATIENT)
Dept: OCCUPATIONAL THERAPY | Facility: CLINIC | Age: 44
Setting detail: THERAPIES SERIES
Discharge: HOME | End: 2024-06-17
Payer: COMMERCIAL

## 2024-06-17 PROCEDURE — 97110 THERAPEUTIC EXERCISES: CPT

## 2024-06-17 NOTE — FLOWSHEET NOTE
Rosalee 1/2   added   Wrist maze  2x  added   Other: Access Code: [email or web address]  URL: [email or web address]  Date: 05/29/2024  Prepared by: Ken Sharpe    Other:      Assessment: [x] Progressing toward goals.  Pt tolerated STM and PROM/AROM to wrist and digits.  Reviewed HEP.  Completed light strengthening and ROM and as per exercise grid above.    [] No change.  [] Other     [x] Pt would benefit from skilled OT services to address decreased AROM for the completion of ADLs/IADLs.     STG/LTG  Short Term Goals: (  8    Treatments)  Decrease Pain in dominant R hand by 2 points   Increase AROM in the R wrist by  15% in flexion  10% into extension  20% into supination  Increase  strength: will be set at future date  Increase function:UE Functional Index Score to 50/80 or more points to promote increased functional abilities  Scar will be soft and pliable with minimal tethering.  Patient to be independent with home exercise program as demonstrated by performance with correct form without cues.     Long Term Goals: (  16  Treatments)  Decrease Pain in dominant R hand by 4 points   Increase AROM in the R wrist by  25% in flexion  15% into extension  30% into supination  Increase  strength: will be set at future date  Increase function:UE Functional Index Score to 70/80 or more points to promote increased functional abilities  Scar will be soft and pliable with minimal tethering.  Patient to be independent with home exercise program as demonstrated by performance with correct form without cues.     Patient Goals: Improve wrist ROM    Pt. Education:  [x] Yes  [] No  [x] Reviewed Prior HEP/Ed  Method of Education: [x] Verbal  [x] Demo  [x] Written  Re:  HEP and technique  Comprehension of Education:  [x] Verbalizes understanding.  [x] Demonstrates understanding.  [] Needs review.  [] Demonstrates/verbalizes HEP/Ed previously given.      Plan: [x] Continue current frequency toward short and long term goals.  [x]

## 2024-06-19 ENCOUNTER — HOSPITAL ENCOUNTER (OUTPATIENT)
Dept: OCCUPATIONAL THERAPY | Facility: CLINIC | Age: 44
Setting detail: THERAPIES SERIES
Discharge: HOME | End: 2024-06-19
Payer: COMMERCIAL

## 2024-06-19 PROCEDURE — 97110 THERAPEUTIC EXERCISES: CPT

## 2024-06-19 NOTE — FLOWSHEET NOTE
goals.  [x] Specific Instructions for subsequent treatments: ROM and light strengthening continue   [] Other:       Time In: 8:00  Time Out: 8:35        Treatment Charges: Mins Units (BWC) Time In/Out:   []  Modalities:       []  Ultrasound      [x]  Ther Exercise 35 2    []  Manual Therapy      []  Ther Activities      []  Orthotic fit/train      []  Orthotic recheck      []  Other      Total Billable time 35         Electronically signed by:  KATELYN Bermeo/ALEX

## 2024-07-01 ENCOUNTER — HOSPITAL ENCOUNTER (OUTPATIENT)
Dept: OCCUPATIONAL THERAPY | Facility: CLINIC | Age: 44
Setting detail: THERAPIES SERIES
Discharge: HOME | End: 2024-07-01
Payer: COMMERCIAL

## 2024-07-01 NOTE — FLOWSHEET NOTE
[] [facility]       Occupational Therapy       [address]       Phone: [phone]       Fax: [phone] [x] [facility] Occupational  Therapy at [place]       [address]       Phone: [phone]       Fax: [phone] [] [facility]  Outpatient Rehabilitation &  Therapy  [address]   [address]  P: [phone]  F: [phone]      Occupational Therapy Cancel/No Show note    Date: 2024  Patient: Lauren Monte  : 1980  MRN: [id number]  Cancels/No Shows to date: 2/3    For today's appointment patient:  [x]  Cancelled  []  Rescheduled appointment  []  No-show     Reason given by patient:  []  Patient ill  []  Conflicting appointment  []  No transportation    []  Conflict with work  []  No reason given  [x]  Other:     Comments:  Pt LVM stating she \"has been going through some things\"    Electronically signed by: Ken Sharpe OTR/L

## 2024-07-03 ENCOUNTER — HOSPITAL ENCOUNTER (OUTPATIENT)
Dept: OCCUPATIONAL THERAPY | Facility: CLINIC | Age: 44
Setting detail: THERAPIES SERIES
Discharge: HOME | End: 2024-07-03
Payer: COMMERCIAL

## 2024-07-03 ENCOUNTER — OFFICE VISIT (OUTPATIENT)
Age: 44
End: 2024-07-03

## 2024-07-03 DIAGNOSIS — Z48.89 POSTOPERATIVE VISIT: Primary | ICD-10-CM

## 2024-07-03 PROCEDURE — 97110 THERAPEUTIC EXERCISES: CPT

## 2024-07-03 PROCEDURE — 99024 POSTOP FOLLOW-UP VISIT: CPT | Performed by: ORTHOPAEDIC SURGERY

## 2024-07-03 PROCEDURE — 97140 MANUAL THERAPY 1/> REGIONS: CPT

## 2024-07-03 NOTE — PROGRESS NOTES
[facility] Orthopedics & Sports Medicine    [facility] Orthopaedics and Sports Medicine  [address] [place] [address], [address]  [place] [address]    Surgery:    4/11/2024  Right Wrist Arthroscopy Triangular Fibrocartilage Complex Repair - Right    History of Present Illness:    This is a 44 y.o. female who presents to the clinic today for post op follow up for Right Wrist Arthroscopy Triangular Fibrocartilage Complex Repair - Right on 4/11/2024 .  She is doing well.  She states she still has a little bit of stiffness in the morning but her pain is definitely better than it was preoperatively.  Her range of motion has returned and she is working on some strengthening and therapy.    She has good flexion extension full pronation supination.  Just a little bit of tenderness over the scar tissue.    At this point emphasized and scar tissue massage.  I am fine with her progressing with her strengthening.  She can progress and use the hand as tolerated.  I explained to her I think she will get better from a scar tissue standpoint for probably 6 to 9 months.  At this point she is doing well we can see her back on as-needed basis.          Electronically signed by Kiko Dobbins MD on 7/3/2024 at 8:40 AM

## 2024-07-03 NOTE — FLOWSHEET NOTE
Flexion/Ext  Wrist pronation  Wrist supination  Wrist ulnar deviation  Wrist radial dev 3x10 yellow  X   Pronated ball curl 2x10 500g Green ball X   Wrist roll 5 1lbs  X   Other: Access Code: [email or web address]  URL: [email or web address]  Date: 05/29/2024  Prepared by: Ken Sharpe    Treatment Charges: Mins Units (BWC) Time In/Out:   []  Modalities:       []  Ultrasound      [x]  Ther Exercise 32 2    [x]  Manual Therapy 8 1    []  Ther Activities      []  Orthotic fit/train      []  Orthotic recheck      []  Other      Total Billable time 40 mins       Assessment: [x] Progressing toward goals.  Completed IASTM to the scar tissue on the ulnar aspect of the R wrist. Completed light strengthening for the R UE. Pt tolerated tx well on this date.      [] No change.  [] Other     [x] Pt would benefit from skilled OT services to address decreased AROM for the completion of ADLs/IADLs.     STG/LTG  Short Term Goals: (  8    Treatments)  Decrease Pain in dominant R hand by 2 points   Increase AROM in the R wrist by  15% in flexion  10% into extension  20% into supination  Increase  strength: will be set at future date  Increase function:UE Functional Index Score to 50/80 or more points to promote increased functional abilities  Scar will be soft and pliable with minimal tethering.  Patient to be independent with home exercise program as demonstrated by performance with correct form without cues.     Long Term Goals: (  16  Treatments)  Decrease Pain in dominant R hand by 4 points   Increase AROM in the R wrist by  25% in flexion  15% into extension  30% into supination  Increase  strength: will be set at future date  Increase function:UE Functional Index Score to 70/80 or more points to promote increased functional abilities  Scar will be soft and pliable with minimal tethering.  Patient to be independent with home exercise program as demonstrated by performance with correct form without cues.     Patient Goals:

## 2024-07-08 ENCOUNTER — HOSPITAL ENCOUNTER (OUTPATIENT)
Dept: OCCUPATIONAL THERAPY | Facility: CLINIC | Age: 44
Setting detail: THERAPIES SERIES
Discharge: HOME | End: 2024-07-08
Payer: COMMERCIAL

## 2024-07-08 PROCEDURE — 97140 MANUAL THERAPY 1/> REGIONS: CPT

## 2024-07-08 PROCEDURE — 97110 THERAPEUTIC EXERCISES: CPT

## 2024-07-08 NOTE — FLOWSHEET NOTE
[] [facility]  Outpatient Rehabilitation &  Therapy  [address]  [phone]  F: [phone] [] [facility]  Outpatient Rehabilitation &  Therapy  [address]   [address]  P: [phone]  F: [phone] [x] [facility]  Outpatient Rehabilitation &  Therapy  [address]  P: [phone]  F: [phone] [] [facility]  Outpatient Rehabilitation &  Therapy  [address]   P: [phone]  F: [phone] [] [facility]   Outpatient Rehabilitation   & Therapy  [address]  P: [phone]   F: [phone]     Occupational Therapy Daily Treatment Note    Date:  2024  Patient Name:  Lauren Monte    :  1980  MRN: [id number]  Referring Provider:  Kiko Dobbins MD   Insurance: [facility] 35/40 visits remaining   Medical Diagnosis: G89.18 (ICD-10-CM) - Post-op pain      Rehab Codes: stiffness in wrist M25.63,, effusion of wrist M25.43,, or pain in right forearm M79.631,  Onset Date: 24                 Gayle Booth Appt: PRN  Visit# / total visits: ; Progress note for Medicare patient due at visit 8-10    Cancels/No Shows: 2/3      Subjective:    Pain:  [x] Yes  [] No Location: scar area Pain Rating: (0-10 scale) 3/10  Pain altered Tx:  [x] No  [] Yes  Action:  Pt Comments:  Pt reports washing hair using a cup to pour water and it was difficult and hurt this morning.     Precautions:  n/a  Surgery procedure and date:  s/p Right Wrist Arthroscopy Triangular Fibrocartilage Complex Repair  on 24.     Objective:  Today's Treatment:  Modalities:  Exercise Flow Sheet:  Exercise Reps/Time Weight/Level Comments Completed   Wrist AROM 3x10 AROM  X   Pronation/supination  3x10 AROM  X   Putty gripping 3-5 mins yellow  X   digigrip     isolate  10x2 ea   red   X   3-way wrist curl  10x2 2lbs   X    sponge transfer 2x     -   Pronation/Supination 10x2 1# forearm bar  X   Metal gripper 2x10

## 2024-07-10 ENCOUNTER — HOSPITAL ENCOUNTER (OUTPATIENT)
Dept: OCCUPATIONAL THERAPY | Facility: CLINIC | Age: 44
Setting detail: THERAPIES SERIES
Discharge: HOME | End: 2024-07-10
Payer: COMMERCIAL

## 2024-07-10 PROCEDURE — 97110 THERAPEUTIC EXERCISES: CPT

## 2024-07-10 NOTE — FLOWSHEET NOTE
[] [facility]  Outpatient Rehabilitation &  Therapy  [address]  [phone]  F: [phone] [] [facility]  Outpatient Rehabilitation &  Therapy  [address]   [address]  P: [phone]  F: [phone] [x] [facility]  Outpatient Rehabilitation &  Therapy  [address]  P: [phone]  F: [phone] [] [facility]  Outpatient Rehabilitation &  Therapy  [address]   P: [phone]  F: [phone] [] [facility]   Outpatient Rehabilitation   & Therapy  [address]  P: [phone]   F: [phone]     Occupational Therapy Daily Treatment Note    Date:  7/10/2024  Patient Name:  Lauren Monte    :  1980  MRN: [id number]  Referring Provider:  Kiko Dobbins MD   Insurance: [facility] 35/40 visits remaining   Medical Diagnosis: G89.18 (ICD-10-CM) - Post-op pain      Rehab Codes: stiffness in wrist M25.63,, effusion of wrist M25.43,, or pain in right forearm M79.631,  Onset Date: 24                 Gayle Booth Appt: PRN  Visit# / total visits: ; Progress note for Medicare patient due at visit 8-10    Cancels/No Shows: 2/3      Subjective:    Pain:  [x] Yes  [] No Location: scar area Pain Rating: (0-10 scale) 3/10  Pain altered Tx:  [x] No  [] Yes  Action:  Pt Comments:  It is about the same, it hurts at times.  Precautions:  n/a  Surgery procedure and date:  s/p Right Wrist Arthroscopy Triangular Fibrocartilage Complex Repair  on 24.     Objective:  Today's Treatment:  Modalities:  Exercise Flow Sheet:  Exercise Reps/Time Weight/Level Comments Completed   Wrist AROM 3x10 AROM  -   Pronation/supination  3x10 AROM  -   Putty gripping 3-5 mins yellow  X   digigrip     isolate  10x3 ea   red   X   3-way wrist curl  10x2 2lbs   X    sponge transfer 2x     -   Pronation/Supination 10x2 1# forearm bar  X   Metal gripper w/foam cubes 1x 15lbs Added  X   Power web 3x10 yellow  X

## 2024-07-12 RX ORDER — FLUOXETINE HYDROCHLORIDE 40 MG/1
CAPSULE ORAL DAILY
Qty: 90 CAPSULE | Refills: 3 | Status: SHIPPED | OUTPATIENT
Start: 2024-07-12

## 2024-07-15 ENCOUNTER — APPOINTMENT (OUTPATIENT)
Dept: OCCUPATIONAL THERAPY | Facility: CLINIC | Age: 44
End: 2024-07-15
Payer: COMMERCIAL

## 2024-07-17 ENCOUNTER — HOSPITAL ENCOUNTER (OUTPATIENT)
Dept: OCCUPATIONAL THERAPY | Facility: CLINIC | Age: 44
Setting detail: THERAPIES SERIES
End: 2024-07-17
Payer: COMMERCIAL

## 2024-10-18 ENCOUNTER — HOSPITAL ENCOUNTER (OUTPATIENT)
Age: 44
Setting detail: SPECIMEN
Discharge: HOME | End: 2024-10-18

## 2024-10-18 DIAGNOSIS — E78.2 MIXED HYPERLIPIDEMIA: ICD-10-CM

## 2024-10-18 DIAGNOSIS — Z79.899 ON STATIN THERAPY: ICD-10-CM

## 2024-10-18 DIAGNOSIS — R63.5 WEIGHT GAIN: ICD-10-CM

## 2024-10-18 DIAGNOSIS — I10 ESSENTIAL HYPERTENSION: ICD-10-CM

## 2024-10-18 DIAGNOSIS — E55.9 VITAMIN D DEFICIENCY: ICD-10-CM

## 2024-10-18 DIAGNOSIS — E03.9 HYPOTHYROIDISM, UNSPECIFIED TYPE: ICD-10-CM

## 2024-10-18 LAB
25(OH)D3 SERPL-MCNC: 63 NG/ML (ref 30–100)
ALBUMIN SERPL-MCNC: 4.4 G/DL (ref 3.5–5.2)
ALBUMIN/GLOB SERPL: 2 {RATIO} (ref 1–2.5)
ALP SERPL-CCNC: 45 U/L (ref 35–104)
ALT SERPL-CCNC: 8 U/L (ref 10–35)
ANION GAP SERPL CALCULATED.3IONS-SCNC: 12 MMOL/L (ref 9–16)
AST SERPL-CCNC: 15 U/L (ref 10–35)
BILIRUB SERPL-MCNC: 0.4 MG/DL (ref 0–1.2)
BUN SERPL-MCNC: 11 MG/DL (ref 6–20)
CALCIUM SERPL-MCNC: 9.6 MG/DL (ref 8.6–10.4)
CHLORIDE SERPL-SCNC: 101 MMOL/L (ref 98–107)
CHOLEST SERPL-MCNC: 147 MG/DL (ref 0–199)
CHOLESTEROL/HDL RATIO: 4
CO2 SERPL-SCNC: 26 MMOL/L (ref 20–31)
CORTIS SERPL-MCNC: 9.7 UG/DL (ref 2.5–19.5)
CORTISOL COLLECTION INFO: NORMAL
CREAT SERPL-MCNC: 0.7 MG/DL (ref 0.5–0.9)
ERYTHROCYTE [DISTWIDTH] IN BLOOD BY AUTOMATED COUNT: 14.8 % (ref 11.8–14.4)
EST. AVERAGE GLUCOSE BLD GHB EST-MCNC: 117 MG/DL
GFR, ESTIMATED: >90 ML/MIN/1.73M2
GLUCOSE SERPL-MCNC: 104 MG/DL (ref 74–99)
HBA1C MFR BLD: 5.7 % (ref 4–6)
HCT VFR BLD AUTO: 44.6 % (ref 36.3–47.1)
HDLC SERPL-MCNC: 41 MG/DL
HGB BLD-MCNC: 14.2 G/DL (ref 11.9–15.1)
LDLC SERPL CALC-MCNC: 73 MG/DL (ref 0–100)
MCH RBC QN AUTO: 28.8 PG (ref 25.2–33.5)
MCHC RBC AUTO-ENTMCNC: 31.8 G/DL (ref 28.4–34.8)
MCV RBC AUTO: 90.5 FL (ref 82.6–102.9)
NRBC BLD-RTO: 0 PER 100 WBC
PLATELET # BLD AUTO: 285 K/UL (ref 138–453)
PMV BLD AUTO: 11.5 FL (ref 8.1–13.5)
POTASSIUM SERPL-SCNC: 4.4 MMOL/L (ref 3.7–5.3)
PROT SERPL-MCNC: 6.8 G/DL (ref 6.6–8.7)
RBC # BLD AUTO: 4.93 M/UL (ref 3.95–5.11)
SODIUM SERPL-SCNC: 139 MMOL/L (ref 136–145)
T4 FREE SERPL-MCNC: 0.9 NG/DL (ref 0.92–1.68)
TRIGL SERPL-MCNC: 167 MG/DL
TSH SERPL DL<=0.05 MIU/L-ACNC: 2.97 UIU/ML (ref 0.27–4.2)
VLDLC SERPL CALC-MCNC: 33 MG/DL
WBC OTHER # BLD: 8 K/UL (ref 3.5–11.3)

## 2024-10-24 ENCOUNTER — PATIENT MESSAGE (OUTPATIENT)
Dept: FAMILY MEDICINE CLINIC | Age: 44
End: 2024-10-24

## 2024-10-24 ASSESSMENT — ENCOUNTER SYMPTOMS
NAUSEA: 0
ABDOMINAL PAIN: 0
COUGH: 0
SORE THROAT: 0
ABDOMINAL DISTENTION: 0
DIARRHEA: 0
RHINORRHEA: 0
CHEST TIGHTNESS: 0
CONSTIPATION: 0
SHORTNESS OF BREATH: 0
VOMITING: 0

## 2024-10-24 NOTE — PROGRESS NOTES
Elvi Porter, APRN-CNP  [facility]  [facility]  [address], [address]  [place] [address]  Dept: [phone]  Dept Fax: [phone]     PATIENT ID: Lauren Monte is a 44 y.o. female.    HPI: Established pt here today for f/u on chronic medical problems; HTN, HLD, hypothyroidism, vitamin d def, allergies anxiety, depression, go over labs and/or diagnostic studies, and medication refills.  She does express concern regarding the inability to loose weight. She has been watching her carbs, sugars and exercising regularly but is not experiencing the weight loss she is expecting. Her weight today is 235 lbs with a BMI of 43.06.  She has been on Adipex in the past and did well with it. She has completed her 6 month holiday and would like to restart it.  Pt denies any fever or chills.  Pt today denies any HA, chest pain, or SOB.  Pt denies any N/V/D/C or abdominal pain.  Pt states mood is stable on meds. Pt with arthralgias on and off, but stable on meds.     My previous office notes, labs and diagnostic studies were reviewed prior to and during encounter.  The patient's past medical, surgical, social, and family history as well as current medications and allergies were reviewed as documented in today's encounter by ABRAHAM Bain.     Current Outpatient Medications on File Prior to Visit   Medication Sig Dispense Refill    FLUoxetine (PROZAC) 40 MG capsule TAKE 1 CAPSULE BY MOUTH EVERY DAY 90 capsule 3    ibuprofen (ADVIL;MOTRIN) 800 MG tablet Take 1 tablet by mouth every 8 hours as needed for Pain 90 tablet 0    norethindrone-ethinyl estradiol (LOESTRIN FE 1/20) 1-20 MG-MCG per tablet Take 1 tablet by mouth daily Please fill early given skipping placebo pills (Patient taking differently: Take 1 tablet by mouth daily) 3 packet 3    meloxicam (MOBIC) 15 MG tablet TAKE 1 TABLET DAILY 90 tablet 3    lisinopril-hydroCHLOROthiazide (PRINZIDE;ZESTORETIC) 20-12.5 MG

## 2024-10-25 ENCOUNTER — OFFICE VISIT (OUTPATIENT)
Dept: FAMILY MEDICINE CLINIC | Age: 44
End: 2024-10-25
Payer: COMMERCIAL

## 2024-10-25 VITALS
BODY MASS INDEX: 43.32 KG/M2 | DIASTOLIC BLOOD PRESSURE: 78 MMHG | RESPIRATION RATE: 16 BRPM | HEIGHT: 62 IN | TEMPERATURE: 97.3 F | HEART RATE: 72 BPM | WEIGHT: 235.4 LBS | SYSTOLIC BLOOD PRESSURE: 128 MMHG | OXYGEN SATURATION: 99 %

## 2024-10-25 DIAGNOSIS — E03.9 HYPOTHYROIDISM, UNSPECIFIED TYPE: ICD-10-CM

## 2024-10-25 DIAGNOSIS — E55.9 VITAMIN D DEFICIENCY: ICD-10-CM

## 2024-10-25 DIAGNOSIS — R73.9 HYPERGLYCEMIA: ICD-10-CM

## 2024-10-25 DIAGNOSIS — E78.2 MIXED HYPERLIPIDEMIA: ICD-10-CM

## 2024-10-25 DIAGNOSIS — R63.5 WEIGHT GAIN: ICD-10-CM

## 2024-10-25 DIAGNOSIS — Z79.899 ON STATIN THERAPY: ICD-10-CM

## 2024-10-25 DIAGNOSIS — M51.369 BULGING LUMBAR DISC: ICD-10-CM

## 2024-10-25 DIAGNOSIS — F41.9 ANXIETY AND DEPRESSION: ICD-10-CM

## 2024-10-25 DIAGNOSIS — I10 ESSENTIAL HYPERTENSION: Primary | ICD-10-CM

## 2024-10-25 DIAGNOSIS — F32.A ANXIETY AND DEPRESSION: ICD-10-CM

## 2024-10-25 PROBLEM — M47.814 SPONDYLOSIS OF THORACIC SPINE: Status: ACTIVE | Noted: 2024-10-25

## 2024-10-25 PROCEDURE — 99214 OFFICE O/P EST MOD 30 MIN: CPT | Performed by: NURSE PRACTITIONER

## 2024-10-25 PROCEDURE — 3078F DIAST BP <80 MM HG: CPT | Performed by: NURSE PRACTITIONER

## 2024-10-25 PROCEDURE — 3074F SYST BP LT 130 MM HG: CPT | Performed by: NURSE PRACTITIONER

## 2024-10-25 RX ORDER — PHENTERMINE HYDROCHLORIDE 37.5 MG/1
37.5 TABLET ORAL
Qty: 30 TABLET | Refills: 0 | Status: SHIPPED | OUTPATIENT
Start: 2024-10-25 | End: 2024-11-24

## 2024-10-25 ASSESSMENT — ENCOUNTER SYMPTOMS: BACK PAIN: 1

## 2024-10-29 ENCOUNTER — E-VISIT (OUTPATIENT)
Dept: FAMILY MEDICINE CLINIC | Age: 44
End: 2024-10-29
Payer: COMMERCIAL

## 2024-10-29 DIAGNOSIS — J40 BRONCHITIS: Primary | ICD-10-CM

## 2024-10-29 PROCEDURE — 99422 OL DIG E/M SVC 11-20 MIN: CPT | Performed by: NURSE PRACTITIONER

## 2024-10-29 RX ORDER — AZITHROMYCIN 250 MG/1
TABLET, FILM COATED ORAL
Qty: 6 TABLET | Refills: 0 | Status: SHIPPED | OUTPATIENT
Start: 2024-10-29 | End: 2024-11-08

## 2024-10-29 ASSESSMENT — LIFESTYLE VARIABLES
SMOKING_YEARS: 15
PACKS_PER_DAY: 1
SMOKING_STATUS: NO, I'M A FORMER SMOKER

## 2024-10-29 NOTE — PROGRESS NOTES
Elvi Shanks, APRN-CNP  [facility]  [facility]  [address]  [place] [address]  Dept: [phone]  Dept Fax: [phone]    Geno E-Visit:    PATIENT ID: Lauren Monte is a 44 y.o. female.    HPI: As per patient provided history    SUBJECTIVE: See E-visit Questionnaire     Diagnosis Orders   1. Bronchitis  azithromycin (ZITHROMAX) 250 MG tablet         PLAN:  1. Bronchitis  - Given above noted symptoms, will call in ATB and cough suppressant  - Symptomatic therapy suggested: push fluids, rest, use acetaminophen, ibuprofen and OTC Mucinex DM.    For fevers or pain: acetaminophen (Tylenol) or ibuprofen (Advil, Motrin) or naproxen (Aleve)   For congestion or sinus pressure: medications containing guaifenesin to help break up mucus, such as Mucinex or Robitussin, medications containing pseudoephedrine or phenylephrine, such as Sudafed and nasal steroid sprays, such as Flonase, Sensimist, Rhinocort or Nasonex  For runny nose, sneezing or watery/itchy eyes: less sedating antihistamines, such as loratidine (Claritin), fexofenadine (Allegra) or Cetirizine (Zyrtec)    - Call or return to office if symptoms worsen or fail to improve as anticipated.     11-20 minutes were spent on the digital evaluation and management of this patient.     Electronically signed by MARCOS Jacobs NP on 10/29/2024 at 4:22 PM

## 2024-11-07 RX ORDER — MELOXICAM 15 MG/1
TABLET ORAL
Qty: 90 TABLET | Refills: 3 | Status: SHIPPED | OUTPATIENT
Start: 2024-11-07

## 2024-11-07 RX ORDER — LEVOTHYROXINE SODIUM 25 UG/1
25 TABLET ORAL DAILY
Qty: 90 TABLET | Refills: 3 | Status: SHIPPED | OUTPATIENT
Start: 2024-11-07

## 2024-11-27 ENCOUNTER — OFFICE VISIT (OUTPATIENT)
Dept: FAMILY MEDICINE CLINIC | Age: 44
End: 2024-11-27
Payer: COMMERCIAL

## 2024-11-27 VITALS
DIASTOLIC BLOOD PRESSURE: 76 MMHG | WEIGHT: 237 LBS | OXYGEN SATURATION: 99 % | RESPIRATION RATE: 16 BRPM | TEMPERATURE: 97.2 F | HEART RATE: 74 BPM | BODY MASS INDEX: 43.61 KG/M2 | SYSTOLIC BLOOD PRESSURE: 126 MMHG | HEIGHT: 62 IN

## 2024-11-27 DIAGNOSIS — R63.5 WEIGHT GAIN: Primary | ICD-10-CM

## 2024-11-27 PROCEDURE — 3078F DIAST BP <80 MM HG: CPT | Performed by: NURSE PRACTITIONER

## 2024-11-27 PROCEDURE — 3074F SYST BP LT 130 MM HG: CPT | Performed by: NURSE PRACTITIONER

## 2024-11-27 PROCEDURE — 99213 OFFICE O/P EST LOW 20 MIN: CPT | Performed by: NURSE PRACTITIONER

## 2024-11-27 RX ORDER — PHENTERMINE HYDROCHLORIDE 37.5 MG/1
37.5 TABLET ORAL
Qty: 30 TABLET | Refills: 0 | Status: SHIPPED | OUTPATIENT
Start: 2024-11-27 | End: 2024-12-27

## 2024-11-27 ASSESSMENT — ENCOUNTER SYMPTOMS
SHORTNESS OF BREATH: 0
COUGH: 0
RHINORRHEA: 0
VOMITING: 0
ABDOMINAL DISTENTION: 0
NAUSEA: 0
CONSTIPATION: 0
SORE THROAT: 0
BACK PAIN: 1
CHEST TIGHTNESS: 0
DIARRHEA: 0
ABDOMINAL PAIN: 0

## 2024-11-27 NOTE — PROGRESS NOTES
Elvi Porter, APRN-CNP  [facility]  [facility]  [address]  [place] [address]  Dept: [phone]  Dept Fax: [phone]     PATIENT ID: Lauren Monte is a 44 y.o. female.    HPI: Pt here today for BP and weight check while being on Adipex.  A refill of the medication is needed today. Patient reports compliance with the Adipex and is tolerating it without side effects. She has been watching her diet and staying active. She has gained 2 #'s this past month.  Pt denies any fever or chills.  Pt today denies any HA, chest pain, or SOB.  Pt denies any N/V/D/C or abdominal pain. Otherwise patient is doing well on current tx and voices no other concerns today.      My previous office notes, labs and diagnostic studies were reviewed prior to and during encounter.  The patient's past medical, surgical, social, and family history as well as current medications and allergies were reviewed as documented in today's encounter by ABRAHAM Bain.     Current Outpatient Medications on File Prior to Visit   Medication Sig Dispense Refill    levothyroxine (SYNTHROID) 25 MCG tablet Take 1 tablet by mouth daily 90 tablet 3    meloxicam (MOBIC) 15 MG tablet TAKE 1 TABLET DAILY 90 tablet 3    FLUoxetine (PROZAC) 40 MG capsule TAKE 1 CAPSULE BY MOUTH EVERY DAY 90 capsule 3    ibuprofen (ADVIL;MOTRIN) 800 MG tablet Take 1 tablet by mouth every 8 hours as needed for Pain 90 tablet 0    norethindrone-ethinyl estradiol (LOESTRIN FE 1/20) 1-20 MG-MCG per tablet Take 1 tablet by mouth daily Please fill early given skipping placebo pills (Patient taking differently: Take 1 tablet by mouth daily) 3 packet 3    lisinopril-hydroCHLOROthiazide (PRINZIDE;ZESTORETIC) 20-12.5 MG per tablet TAKE 2 TABLETS DAILY 180 tablet 3    cetirizine (ZYRTEC ALLERGY) 10 MG tablet       gabapentin (NEURONTIN) 100 MG capsule Take 1 capsule by mouth 3 times daily for 90 days. (Patient taking

## 2024-12-19 ENCOUNTER — OFFICE VISIT (OUTPATIENT)
Dept: FAMILY MEDICINE CLINIC | Age: 44
End: 2024-12-19
Payer: COMMERCIAL

## 2024-12-19 VITALS
OXYGEN SATURATION: 100 % | SYSTOLIC BLOOD PRESSURE: 128 MMHG | RESPIRATION RATE: 16 BRPM | BODY MASS INDEX: 42.88 KG/M2 | TEMPERATURE: 97.4 F | HEIGHT: 62 IN | WEIGHT: 233 LBS | HEART RATE: 57 BPM | DIASTOLIC BLOOD PRESSURE: 78 MMHG

## 2024-12-19 DIAGNOSIS — R63.5 WEIGHT GAIN: Primary | ICD-10-CM

## 2024-12-19 PROCEDURE — 3074F SYST BP LT 130 MM HG: CPT | Performed by: NURSE PRACTITIONER

## 2024-12-19 PROCEDURE — 3078F DIAST BP <80 MM HG: CPT | Performed by: NURSE PRACTITIONER

## 2024-12-19 PROCEDURE — 99213 OFFICE O/P EST LOW 20 MIN: CPT | Performed by: NURSE PRACTITIONER

## 2024-12-19 RX ORDER — PHENTERMINE HYDROCHLORIDE 37.5 MG/1
37.5 TABLET ORAL
Qty: 30 TABLET | Refills: 0 | Status: SHIPPED | OUTPATIENT
Start: 2024-12-19 | End: 2025-01-18

## 2024-12-19 ASSESSMENT — ENCOUNTER SYMPTOMS
CHEST TIGHTNESS: 0
CONSTIPATION: 0
SHORTNESS OF BREATH: 0
BACK PAIN: 1
ABDOMINAL DISTENTION: 0
ABDOMINAL PAIN: 0
VOMITING: 0
NAUSEA: 0
RHINORRHEA: 0
SORE THROAT: 0
DIARRHEA: 0
COUGH: 0

## 2024-12-19 NOTE — PROGRESS NOTES
Elvi Porter, APRN-CNP  [facility]  [facility]  [address]  [place] [address]  Dept: [phone]  Dept Fax: [phone]     PATIENT ID: Lauren Monte is a 44 y.o. female.    HPI: Pt here today for BP and weight check while being on Adipex.  A refill of the medication is needed today. Patient reports compliance with the Adipex and is tolerating it without side effects. She has been watching her diet and staying active. She has lost 4 #'s this past month.  Pt denies any fever or chills.  Pt today denies any HA, chest pain, or SOB.  Pt denies any N/V/D/C or abdominal pain. Otherwise patient is doing well on current tx and voices no other concerns today.      My previous office notes, labs and diagnostic studies were reviewed prior to and during encounter.  The patient's past medical, surgical, social, and family history as well as current medications and allergies were reviewed as documented in today's encounter by ABRAHAM Bain.     Current Outpatient Medications on File Prior to Visit   Medication Sig Dispense Refill    levothyroxine (SYNTHROID) 25 MCG tablet Take 1 tablet by mouth daily 90 tablet 3    meloxicam (MOBIC) 15 MG tablet TAKE 1 TABLET DAILY 90 tablet 3    FLUoxetine (PROZAC) 40 MG capsule TAKE 1 CAPSULE BY MOUTH EVERY DAY 90 capsule 3    ibuprofen (ADVIL;MOTRIN) 800 MG tablet Take 1 tablet by mouth every 8 hours as needed for Pain 90 tablet 0    norethindrone-ethinyl estradiol (LOESTRIN FE 1/20) 1-20 MG-MCG per tablet Take 1 tablet by mouth daily Please fill early given skipping placebo pills (Patient taking differently: Take 1 tablet by mouth daily) 3 packet 3    lisinopril-hydroCHLOROthiazide (PRINZIDE;ZESTORETIC) 20-12.5 MG per tablet TAKE 2 TABLETS DAILY 180 tablet 3    cetirizine (ZYRTEC ALLERGY) 10 MG tablet       gabapentin (NEURONTIN) 100 MG capsule Take 1 capsule by mouth 3 times daily for 90 days. (Patient taking

## 2025-01-27 ENCOUNTER — OFFICE VISIT (OUTPATIENT)
Dept: FAMILY MEDICINE CLINIC | Age: 45
End: 2025-01-27
Payer: COMMERCIAL

## 2025-01-27 VITALS
WEIGHT: 229 LBS | TEMPERATURE: 97.3 F | DIASTOLIC BLOOD PRESSURE: 86 MMHG | HEART RATE: 78 BPM | BODY MASS INDEX: 42.14 KG/M2 | HEIGHT: 62 IN | OXYGEN SATURATION: 99 % | RESPIRATION RATE: 16 BRPM | SYSTOLIC BLOOD PRESSURE: 142 MMHG

## 2025-01-27 DIAGNOSIS — R63.5 WEIGHT GAIN: Primary | ICD-10-CM

## 2025-01-27 PROCEDURE — G8417 CALC BMI ABV UP PARAM F/U: HCPCS | Performed by: NURSE PRACTITIONER

## 2025-01-27 PROCEDURE — 3079F DIAST BP 80-89 MM HG: CPT | Performed by: NURSE PRACTITIONER

## 2025-01-27 PROCEDURE — 3077F SYST BP >= 140 MM HG: CPT | Performed by: NURSE PRACTITIONER

## 2025-01-27 PROCEDURE — 99213 OFFICE O/P EST LOW 20 MIN: CPT | Performed by: NURSE PRACTITIONER

## 2025-01-27 PROCEDURE — G8427 DOCREV CUR MEDS BY ELIG CLIN: HCPCS | Performed by: NURSE PRACTITIONER

## 2025-01-27 PROCEDURE — 1036F TOBACCO NON-USER: CPT | Performed by: NURSE PRACTITIONER

## 2025-01-27 SDOH — ECONOMIC STABILITY: FOOD INSECURITY: WITHIN THE PAST 12 MONTHS, THE FOOD YOU BOUGHT JUST DIDN'T LAST AND YOU DIDN'T HAVE MONEY TO GET MORE.: NEVER TRUE

## 2025-01-27 SDOH — ECONOMIC STABILITY: INCOME INSECURITY: IN THE LAST 12 MONTHS, WAS THERE A TIME WHEN YOU WERE NOT ABLE TO PAY THE MORTGAGE OR RENT ON TIME?: NO

## 2025-01-27 SDOH — ECONOMIC STABILITY: FOOD INSECURITY: WITHIN THE PAST 12 MONTHS, YOU WORRIED THAT YOUR FOOD WOULD RUN OUT BEFORE YOU GOT MONEY TO BUY MORE.: NEVER TRUE

## 2025-01-27 SDOH — ECONOMIC STABILITY: TRANSPORTATION INSECURITY
IN THE PAST 12 MONTHS, HAS THE LACK OF TRANSPORTATION KEPT YOU FROM MEDICAL APPOINTMENTS OR FROM GETTING MEDICATIONS?: NO

## 2025-01-27 ASSESSMENT — PATIENT HEALTH QUESTIONNAIRE - PHQ9
SUM OF ALL RESPONSES TO PHQ QUESTIONS 1-9: 2
9. THOUGHTS THAT YOU WOULD BE BETTER OFF DEAD, OR OF HURTING YOURSELF: NOT AT ALL
1. LITTLE INTEREST OR PLEASURE IN DOING THINGS: NOT AT ALL
5. POOR APPETITE OR OVEREATING: NOT AT ALL
6. FEELING BAD ABOUT YOURSELF - OR THAT YOU ARE A FAILURE OR HAVE LET YOURSELF OR YOUR FAMILY DOWN: NOT AT ALL
8. MOVING OR SPEAKING SO SLOWLY THAT OTHER PEOPLE COULD HAVE NOTICED. OR THE OPPOSITE - BEING SO FIDGETY OR RESTLESS THAT YOU HAVE BEEN MOVING AROUND A LOT MORE THAN USUAL: NOT AT ALL
3. TROUBLE FALLING OR STAYING ASLEEP: SEVERAL DAYS
1. LITTLE INTEREST OR PLEASURE IN DOING THINGS: NOT AT ALL
4. FEELING TIRED OR HAVING LITTLE ENERGY: SEVERAL DAYS
7. TROUBLE CONCENTRATING ON THINGS, SUCH AS READING THE NEWSPAPER OR WATCHING TELEVISION: NOT AT ALL
SUM OF ALL RESPONSES TO PHQ9 QUESTIONS 1 & 2: 0
4. FEELING TIRED OR HAVING LITTLE ENERGY: SEVERAL DAYS
2. FEELING DOWN, DEPRESSED OR HOPELESS: NOT AT ALL
2. FEELING DOWN, DEPRESSED OR HOPELESS: NOT AT ALL
5. POOR APPETITE OR OVEREATING: NOT AT ALL
7. TROUBLE CONCENTRATING ON THINGS, SUCH AS READING THE NEWSPAPER OR WATCHING TELEVISION: NOT AT ALL
SUM OF ALL RESPONSES TO PHQ QUESTIONS 1-9: 2
8. MOVING OR SPEAKING SO SLOWLY THAT OTHER PEOPLE COULD HAVE NOTICED. OR THE OPPOSITE, BEING SO FIGETY OR RESTLESS THAT YOU HAVE BEEN MOVING AROUND A LOT MORE THAN USUAL: NOT AT ALL
6. FEELING BAD ABOUT YOURSELF - OR THAT YOU ARE A FAILURE OR HAVE LET YOURSELF OR YOUR FAMILY DOWN: NOT AT ALL
SUM OF ALL RESPONSES TO PHQ QUESTIONS 1-9: 2
10. IF YOU CHECKED OFF ANY PROBLEMS, HOW DIFFICULT HAVE THESE PROBLEMS MADE IT FOR YOU TO DO YOUR WORK, TAKE CARE OF THINGS AT HOME, OR GET ALONG WITH OTHER PEOPLE: NOT DIFFICULT AT ALL
SUM OF ALL RESPONSES TO PHQ QUESTIONS 1-9: 2
SUM OF ALL RESPONSES TO PHQ QUESTIONS 1-9: 2
10. IF YOU CHECKED OFF ANY PROBLEMS, HOW DIFFICULT HAVE THESE PROBLEMS MADE IT FOR YOU TO DO YOUR WORK, TAKE CARE OF THINGS AT HOME, OR GET ALONG WITH OTHER PEOPLE: NOT DIFFICULT AT ALL
9. THOUGHTS THAT YOU WOULD BE BETTER OFF DEAD, OR OF HURTING YOURSELF: NOT AT ALL
3. TROUBLE FALLING OR STAYING ASLEEP: SEVERAL DAYS

## 2025-01-27 ASSESSMENT — ENCOUNTER SYMPTOMS
SHORTNESS OF BREATH: 0
ABDOMINAL DISTENTION: 0
ABDOMINAL PAIN: 0
CONSTIPATION: 0
SORE THROAT: 0
RHINORRHEA: 0
VOMITING: 0
BACK PAIN: 1
NAUSEA: 0
DIARRHEA: 0
COUGH: 0
CHEST TIGHTNESS: 0

## 2025-01-27 NOTE — PROGRESS NOTES
Elvi Porter, APRN-CNP  [facility]  [facility] MEDICINE  [address], [address]  [facility] [address]  Dept: [phone]  Dept Fax: [phone]     PATIENT ID: Lauren Monte is a 44 y.o. female.    HPI: Pt here today for BP and weight check while being on Adipex.  A refill of the medication is needed today. Patient reports compliance with the Adipex and is tolerating it without side effects. She has been watching her diet and staying active. She has lost 4 #'s this past month for a total of 8 lbs over the last 3 months. This is not 5% of her initial weight. According to the latest guidelines, she has not met the required weight reduction to continue the medication.  Pt denies any fever or chills.  Pt today denies any HA, chest pain, or SOB.  Pt denies any N/V/D/C or abdominal pain. Otherwise patient is doing well on current tx and voices no other concerns today.      My previous office notes, labs and diagnostic studies were reviewed prior to and during encounter.  The patient's past medical, surgical, social, and family history as well as current medications and allergies were reviewed as documented in today's encounter by ABRAHAM Bain.     Current Outpatient Medications on File Prior to Visit   Medication Sig Dispense Refill    levothyroxine (SYNTHROID) 25 MCG tablet Take 1 tablet by mouth daily 90 tablet 3    meloxicam (MOBIC) 15 MG tablet TAKE 1 TABLET DAILY 90 tablet 3    FLUoxetine (PROZAC) 40 MG capsule TAKE 1 CAPSULE BY MOUTH EVERY DAY 90 capsule 3    ibuprofen (ADVIL;MOTRIN) 800 MG tablet Take 1 tablet by mouth every 8 hours as needed for Pain 90 tablet 0    norethindrone-ethinyl estradiol (LOESTRIN FE 1/20) 1-20 MG-MCG per tablet Take 1 tablet by mouth daily Please fill early given skipping placebo pills (Patient taking differently: Take 1 tablet by mouth daily) 3 packet 3    lisinopril-hydroCHLOROthiazide (PRINZIDE;ZESTORETIC) 20-12.5 MG per tablet TAKE

## 2025-03-04 ENCOUNTER — TELEPHONE (OUTPATIENT)
Dept: OBGYN CLINIC | Age: 45
End: 2025-03-04

## 2025-03-10 ENCOUNTER — TELEPHONE (OUTPATIENT)
Age: 45
End: 2025-03-10

## 2025-03-11 ENCOUNTER — HOSPITAL ENCOUNTER (OUTPATIENT)
Age: 45
Discharge: HOME | End: 2025-03-13
Payer: COMMERCIAL

## 2025-03-11 ENCOUNTER — OFFICE VISIT (OUTPATIENT)
Age: 45
End: 2025-03-11

## 2025-03-11 VITALS — BODY MASS INDEX: 42.14 KG/M2 | WEIGHT: 229 LBS | HEIGHT: 62 IN

## 2025-03-11 DIAGNOSIS — M25.562 PAIN IN BOTH KNEES, UNSPECIFIED CHRONICITY: ICD-10-CM

## 2025-03-11 DIAGNOSIS — M25.561 PAIN IN BOTH KNEES, UNSPECIFIED CHRONICITY: ICD-10-CM

## 2025-03-11 DIAGNOSIS — M25.561 CHRONIC PATELLOFEMORAL PAIN OF BOTH KNEES: ICD-10-CM

## 2025-03-11 DIAGNOSIS — G89.29 CHRONIC PATELLOFEMORAL PAIN OF BOTH KNEES: ICD-10-CM

## 2025-03-11 DIAGNOSIS — M25.562 CHRONIC PATELLOFEMORAL PAIN OF BOTH KNEES: ICD-10-CM

## 2025-03-11 DIAGNOSIS — M17.0 OSTEOARTHRITIS OF BOTH KNEES, UNSPECIFIED OSTEOARTHRITIS TYPE: Primary | ICD-10-CM

## 2025-03-11 PROCEDURE — 73562 X-RAY EXAM OF KNEE 3: CPT

## 2025-03-11 RX ADMIN — METHYLPREDNISOLONE ACETATE 80 MG: 80 INJECTION, SUSPENSION INTRA-ARTICULAR; INTRALESIONAL; INTRAMUSCULAR; SOFT TISSUE at 09:16

## 2025-03-11 RX ADMIN — LIDOCAINE HYDROCHLORIDE 2 ML: 10 INJECTION, SOLUTION INFILTRATION; PERINEURAL at 09:16

## 2025-03-11 NOTE — PROGRESS NOTES
[facility]      [facility] ORTHOPAEDICS AND SPORTS MEDICINE  [address]  [place] [address]  Dept: [phone]  Dept Fax: [phone]    Chief Compliant:  Chief Complaint   Patient presents with    Knee Pain     L knee        History of Present Illness:  This is a pleasant 45 y.o. female who presents to the clinic today for evaluation / follow up of bilateral knee pain.  Patient is been today with bilateral knee pain ongoing for years.  She previously had knee arthroscopy done in both of her knees.  She states on the left knee she had that done in 2013 and an 2011, she had the right knee scoped as well.  She has taken both meloxicam and ibuprofen in the past for this.  She notes that she has significant pain with going up and down the stairs.  She points the medial aspect of her knees as to where most her pain is.  She has had intra-articular injections in the past with good relief.  She presents today for further evaluation.      Physical Exam:    Left knee: Patient is able to fully extend her knee to 0 degrees can flex back to roughly 120 degrees my exam today.  She does have tenderness to palpation along the medial lateral joint lines.  She has crepitus throughout range of motion.  She stable varus valgus stress at 0 and 30 degrees.  There is no palpable effusion on exam today.  No warmth or erythema over joint.    Right knee: Patient is able to fully extend her knee on exam today.  She can flex back to roughly 110 degrees.  She does have tenderness palpation along the medial lateral joint lines.  She has crepitus throughout range of motion.  She stable varus valgus stress at 0 30 degrees.  There is no palpable effusion on exam today.  There is no warmth or erythema over the joint.    Nursing note and vitals reviewed.     Labs and Imaging:     3 views of bilateral knees taken outside facility were

## 2025-03-12 RX ORDER — METHYLPREDNISOLONE ACETATE 80 MG/ML
80 INJECTION, SUSPENSION INTRA-ARTICULAR; INTRALESIONAL; INTRAMUSCULAR; SOFT TISSUE ONCE
Status: COMPLETED | OUTPATIENT
Start: 2025-03-12 | End: 2025-03-11

## 2025-03-12 RX ORDER — LIDOCAINE HYDROCHLORIDE 10 MG/ML
2 INJECTION, SOLUTION INFILTRATION; PERINEURAL ONCE
Status: COMPLETED | OUTPATIENT
Start: 2025-03-12 | End: 2025-03-11

## 2025-03-25 ENCOUNTER — HOSPITAL ENCOUNTER (OUTPATIENT)
Dept: MAMMOGRAPHY | Age: 45
Discharge: HOME | End: 2025-03-27
Payer: COMMERCIAL

## 2025-03-25 VITALS — BODY MASS INDEX: 42.33 KG/M2 | HEIGHT: 62 IN | WEIGHT: 230 LBS

## 2025-03-25 DIAGNOSIS — Z12.31 VISIT FOR SCREENING MAMMOGRAM: ICD-10-CM

## 2025-03-25 PROCEDURE — 77063 BREAST TOMOSYNTHESIS BI: CPT

## 2025-03-26 ENCOUNTER — RESULTS FOLLOW-UP (OUTPATIENT)
Dept: FAMILY MEDICINE CLINIC | Age: 45
End: 2025-03-26

## 2025-04-21 DIAGNOSIS — I10 ESSENTIAL HYPERTENSION: ICD-10-CM

## 2025-04-21 RX ORDER — LISINOPRIL AND HYDROCHLOROTHIAZIDE 12.5; 2 MG/1; MG/1
2 TABLET ORAL DAILY
Qty: 180 TABLET | Refills: 3 | Status: SHIPPED | OUTPATIENT
Start: 2025-04-21

## 2025-04-21 RX ORDER — ATORVASTATIN CALCIUM 10 MG/1
10 TABLET, FILM COATED ORAL DAILY
Qty: 90 TABLET | Refills: 3 | Status: SHIPPED | OUTPATIENT
Start: 2025-04-21

## 2025-05-06 ENCOUNTER — HOSPITAL ENCOUNTER (OUTPATIENT)
Age: 45
Setting detail: SPECIMEN
Discharge: HOME | End: 2025-05-06

## 2025-05-06 DIAGNOSIS — E03.9 HYPOTHYROIDISM, UNSPECIFIED TYPE: ICD-10-CM

## 2025-05-06 DIAGNOSIS — E78.2 MIXED HYPERLIPIDEMIA: ICD-10-CM

## 2025-05-06 DIAGNOSIS — R73.9 HYPERGLYCEMIA: ICD-10-CM

## 2025-05-06 DIAGNOSIS — E55.9 VITAMIN D DEFICIENCY: ICD-10-CM

## 2025-05-06 DIAGNOSIS — I10 ESSENTIAL HYPERTENSION: ICD-10-CM

## 2025-05-06 LAB
25(OH)D3 SERPL-MCNC: 47 NG/ML (ref 30–100)
ALBUMIN SERPL-MCNC: 3.8 G/DL (ref 3.5–5.2)
ALBUMIN/GLOB SERPL: 1.6 {RATIO} (ref 1–2.5)
ALP SERPL-CCNC: 51 U/L (ref 35–104)
ALT SERPL-CCNC: 11 U/L (ref 10–35)
ANION GAP SERPL CALCULATED.3IONS-SCNC: 10 MMOL/L (ref 9–16)
AST SERPL-CCNC: 13 U/L (ref 10–35)
BILIRUB SERPL-MCNC: 0.3 MG/DL (ref 0–1.2)
BUN SERPL-MCNC: 10 MG/DL (ref 6–20)
CALCIUM SERPL-MCNC: 9 MG/DL (ref 8.6–10.4)
CHLORIDE SERPL-SCNC: 102 MMOL/L (ref 98–107)
CHOLEST SERPL-MCNC: 208 MG/DL (ref 0–199)
CHOLESTEROL/HDL RATIO: 4.2
CO2 SERPL-SCNC: 27 MMOL/L (ref 20–31)
CREAT SERPL-MCNC: 0.5 MG/DL (ref 0.6–0.9)
ERYTHROCYTE [DISTWIDTH] IN BLOOD BY AUTOMATED COUNT: 15.3 % (ref 11.8–14.4)
EST. AVERAGE GLUCOSE BLD GHB EST-MCNC: 111 MG/DL
GFR, ESTIMATED: >90 ML/MIN/1.73M2
GLUCOSE SERPL-MCNC: 86 MG/DL (ref 74–99)
HBA1C MFR BLD: 5.5 % (ref 4–6)
HCT VFR BLD AUTO: 40.2 % (ref 36.3–47.1)
HDLC SERPL-MCNC: 49 MG/DL
HGB BLD-MCNC: 13.1 G/DL (ref 11.9–15.1)
LDLC SERPL CALC-MCNC: 119 MG/DL (ref 0–100)
MCH RBC QN AUTO: 28.9 PG (ref 25.2–33.5)
MCHC RBC AUTO-ENTMCNC: 32.6 G/DL (ref 28.4–34.8)
MCV RBC AUTO: 88.5 FL (ref 82.6–102.9)
NRBC BLD-RTO: 0 PER 100 WBC
PLATELET # BLD AUTO: 303 K/UL (ref 138–453)
PMV BLD AUTO: 10.8 FL (ref 8.1–13.5)
POTASSIUM SERPL-SCNC: 4.1 MMOL/L (ref 3.7–5.3)
PROT SERPL-MCNC: 6.2 G/DL (ref 6.6–8.7)
RBC # BLD AUTO: 4.54 M/UL (ref 3.95–5.11)
SODIUM SERPL-SCNC: 139 MMOL/L (ref 136–145)
T4 FREE SERPL-MCNC: 0.9 NG/DL (ref 0.92–1.68)
TRIGL SERPL-MCNC: 199 MG/DL
TSH SERPL DL<=0.05 MIU/L-ACNC: 5.31 UIU/ML (ref 0.27–4.2)
VLDLC SERPL CALC-MCNC: 40 MG/DL (ref 1–30)
WBC OTHER # BLD: 9.6 K/UL (ref 3.5–11.3)

## 2025-05-07 ENCOUNTER — RESULTS FOLLOW-UP (OUTPATIENT)
Dept: FAMILY MEDICINE CLINIC | Age: 45
End: 2025-05-07

## 2025-05-07 ASSESSMENT — ENCOUNTER SYMPTOMS
SORE THROAT: 0
ABDOMINAL DISTENTION: 0
VOMITING: 0
RHINORRHEA: 0
SHORTNESS OF BREATH: 0
DIARRHEA: 0
ABDOMINAL PAIN: 0
NAUSEA: 0
CONSTIPATION: 0
CHEST TIGHTNESS: 0
COUGH: 0

## 2025-05-07 NOTE — PROGRESS NOTES
Elvi Porter, APRN-CNP  [facility]  [facility]  [address], [address]  [facility] [address]  Dept: [phone]  Dept Fax: [phone]     Patient ID: Lauren Monte is a 45 y.o. female.    History of Present Illness  The patient is a 45-year-old female, established patient, here today for follow-up on chronic medical problems, which include hypertension, hyperlipidemia, hypothyroidism, vitamin D deficiency, allergies, anxiety, and depression. She is also here today to go over labs and medication refills she may need.    She reports persistent fatigue despite her smartwatch indicating satisfactory sleep quality. She does not feel well rested upon waking and has resorted to napping during her lunch break at work for the past two days. She acknowledges snoring but it does not disrupt her sleep. She does not experience restlessness or frequent awakenings at night, except for occasional urination. She does not report any episodes of falling asleep while driving. She admits to inadequate water intake and consumption of soda during the day.    Recent blood work results show normal sodium, potassium, kidney function, liver numbers, and sugar levels. Cholesterol levels are slightly elevated with total cholesterol at 208 and LDL at 119. Triglycerides have been fluctuating between 167 and 199. Thyroid function tests indicate a need to increase her levothyroxine dosage due to elevated TSH and low T4 levels. Vitamin D levels are normal. White blood cell count is within normal range at 9.6, and hemoglobin is 13.1. RDW is slightly high, potentially indicating iron deficiency, but MCV and MCH are normal. Iron and B12 levels are to be checked.    She has been experiencing a sinus headache for several days, which was partially alleviated by sinus medication taken the previous night. She also reports a mild cough and runny nose but no colored nasal discharge. She has been using

## 2025-05-08 ENCOUNTER — OFFICE VISIT (OUTPATIENT)
Dept: FAMILY MEDICINE CLINIC | Age: 45
End: 2025-05-08
Payer: COMMERCIAL

## 2025-05-08 ENCOUNTER — HOSPITAL ENCOUNTER (OUTPATIENT)
Age: 45
Discharge: HOME | End: 2025-05-08
Payer: COMMERCIAL

## 2025-05-08 ENCOUNTER — RESULTS FOLLOW-UP (OUTPATIENT)
Dept: FAMILY MEDICINE CLINIC | Age: 45
End: 2025-05-08

## 2025-05-08 VITALS
SYSTOLIC BLOOD PRESSURE: 138 MMHG | OXYGEN SATURATION: 100 % | DIASTOLIC BLOOD PRESSURE: 84 MMHG | TEMPERATURE: 97.2 F | WEIGHT: 236 LBS | BODY MASS INDEX: 43.43 KG/M2 | HEART RATE: 62 BPM | RESPIRATION RATE: 16 BRPM | HEIGHT: 62 IN

## 2025-05-08 DIAGNOSIS — R73.9 HYPERGLYCEMIA: ICD-10-CM

## 2025-05-08 DIAGNOSIS — E78.2 MIXED HYPERLIPIDEMIA: ICD-10-CM

## 2025-05-08 DIAGNOSIS — R53.83 FATIGUE, UNSPECIFIED TYPE: ICD-10-CM

## 2025-05-08 DIAGNOSIS — Z79.899 ON STATIN THERAPY: ICD-10-CM

## 2025-05-08 DIAGNOSIS — J01.90 ACUTE BACTERIAL SINUSITIS: ICD-10-CM

## 2025-05-08 DIAGNOSIS — F32.A ANXIETY AND DEPRESSION: ICD-10-CM

## 2025-05-08 DIAGNOSIS — I10 ESSENTIAL HYPERTENSION: Primary | ICD-10-CM

## 2025-05-08 DIAGNOSIS — J30.9 ALLERGIC RHINITIS, UNSPECIFIED SEASONALITY, UNSPECIFIED TRIGGER: ICD-10-CM

## 2025-05-08 DIAGNOSIS — E03.9 HYPOTHYROIDISM, UNSPECIFIED TYPE: ICD-10-CM

## 2025-05-08 DIAGNOSIS — B96.89 ACUTE BACTERIAL SINUSITIS: ICD-10-CM

## 2025-05-08 DIAGNOSIS — E55.9 VITAMIN D DEFICIENCY: ICD-10-CM

## 2025-05-08 DIAGNOSIS — F41.9 ANXIETY AND DEPRESSION: ICD-10-CM

## 2025-05-08 LAB
CMV IGG SERPL QL IA: 682
CMV IGM SERPL QL IA: 0.2
FOLATE SERPL-MCNC: 7.5 NG/ML (ref 4.8–24.2)
IRON SATN MFR SERPL: 31 % (ref 20–55)
IRON SERPL-MCNC: 76 UG/DL (ref 37–145)
TIBC SERPL-MCNC: 249 UG/DL (ref 250–450)
UNSATURATED IRON BINDING CAPACITY: 173 UG/DL (ref 112–347)
VIT B12 SERPL-MCNC: 363 PG/ML (ref 232–1245)

## 2025-05-08 PROCEDURE — 86645 CMV ANTIBODY IGM: CPT

## 2025-05-08 PROCEDURE — 86663 EPSTEIN-BARR ANTIBODY: CPT

## 2025-05-08 PROCEDURE — 86644 CMV ANTIBODY: CPT

## 2025-05-08 PROCEDURE — 82607 VITAMIN B-12: CPT

## 2025-05-08 PROCEDURE — 86308 HETEROPHILE ANTIBODY SCREEN: CPT

## 2025-05-08 PROCEDURE — 86664 EPSTEIN-BARR NUCLEAR ANTIGEN: CPT

## 2025-05-08 PROCEDURE — G8417 CALC BMI ABV UP PARAM F/U: HCPCS | Performed by: NURSE PRACTITIONER

## 2025-05-08 PROCEDURE — 36415 COLL VENOUS BLD VENIPUNCTURE: CPT

## 2025-05-08 PROCEDURE — 1036F TOBACCO NON-USER: CPT | Performed by: NURSE PRACTITIONER

## 2025-05-08 PROCEDURE — G8427 DOCREV CUR MEDS BY ELIG CLIN: HCPCS | Performed by: NURSE PRACTITIONER

## 2025-05-08 PROCEDURE — 82746 ASSAY OF FOLIC ACID SERUM: CPT

## 2025-05-08 PROCEDURE — 83540 ASSAY OF IRON: CPT

## 2025-05-08 PROCEDURE — 3075F SYST BP GE 130 - 139MM HG: CPT | Performed by: NURSE PRACTITIONER

## 2025-05-08 PROCEDURE — 83550 IRON BINDING TEST: CPT

## 2025-05-08 PROCEDURE — 86665 EPSTEIN-BARR CAPSID VCA: CPT

## 2025-05-08 PROCEDURE — 3079F DIAST BP 80-89 MM HG: CPT | Performed by: NURSE PRACTITIONER

## 2025-05-08 PROCEDURE — 99214 OFFICE O/P EST MOD 30 MIN: CPT | Performed by: NURSE PRACTITIONER

## 2025-05-08 RX ORDER — LEVOTHYROXINE SODIUM 50 UG/1
50 TABLET ORAL DAILY
Qty: 90 TABLET | Refills: 3
Start: 2025-05-08

## 2025-05-08 ASSESSMENT — ENCOUNTER SYMPTOMS
BACK PAIN: 0
SINUS PRESSURE: 1
SINUS PAIN: 1

## 2025-05-09 LAB — HETEROPH AB BLD QL IA: NEGATIVE

## 2025-05-12 LAB
EBV EA-D IGG SER-ACNC: 70 U/ML
EBV INTERPRETATION: ABNORMAL
EBV NA IGG SER IA-ACNC: 142 U/ML
EBV VCA IGG SER-ACNC: 772 U/ML
EBV VCA IGM SER-ACNC: 15 U/ML

## 2025-07-08 DIAGNOSIS — E03.9 HYPOTHYROIDISM, UNSPECIFIED TYPE: ICD-10-CM

## 2025-07-09 ENCOUNTER — E-VISIT (OUTPATIENT)
Dept: FAMILY MEDICINE CLINIC | Age: 45
End: 2025-07-09
Payer: COMMERCIAL

## 2025-07-09 DIAGNOSIS — M54.9 MID BACK PAIN: Primary | ICD-10-CM

## 2025-07-09 PROCEDURE — 99422 OL DIG E/M SVC 11-20 MIN: CPT | Performed by: FAMILY MEDICINE

## 2025-07-09 RX ORDER — LEVOTHYROXINE SODIUM 50 UG/1
50 TABLET ORAL DAILY
Qty: 90 TABLET | Refills: 1 | Status: SHIPPED | OUTPATIENT
Start: 2025-07-09

## 2025-07-09 RX ORDER — PREDNISONE 20 MG/1
TABLET ORAL
Qty: 17 TABLET | Refills: 0 | Status: SHIPPED | OUTPATIENT
Start: 2025-07-09 | End: 2025-07-19

## 2025-07-09 NOTE — PROGRESS NOTES
HPI: as per patient provided history  Exam: N/A (electronic visit)  ASSESSMENT/PLAN:  1. Mid back pain  - Heat or cold and Tylenol prn  - [employer] back stretches  - predniSONE (DELTASONE) 20 MG tablet; Take 2 tabs daily x 4 days, then 1 tab daily x 6 days, then 1/2 tab daily x 6 days  Dispense: 17 tablet; Refill: 0  - tiZANidine (ZANAFLEX) 4 MG tablet; Take 1 tablet by mouth 3 times daily  Dispense: 40 tablet; Refill: 0       Patient instructed to call the office if worsens, or fails to improve as anticipated.     11 minutes were spent on the digital evaluation and management of this patient.

## 2025-07-21 ENCOUNTER — OFFICE VISIT (OUTPATIENT)
Dept: FAMILY MEDICINE CLINIC | Age: 45
End: 2025-07-21
Payer: COMMERCIAL

## 2025-07-21 VITALS
SYSTOLIC BLOOD PRESSURE: 128 MMHG | BODY MASS INDEX: 43.06 KG/M2 | RESPIRATION RATE: 16 BRPM | HEART RATE: 66 BPM | TEMPERATURE: 97.5 F | DIASTOLIC BLOOD PRESSURE: 78 MMHG | OXYGEN SATURATION: 98 % | HEIGHT: 62 IN | WEIGHT: 234 LBS

## 2025-07-21 DIAGNOSIS — E78.2 MIXED HYPERLIPIDEMIA: ICD-10-CM

## 2025-07-21 DIAGNOSIS — F32.A ANXIETY AND DEPRESSION: ICD-10-CM

## 2025-07-21 DIAGNOSIS — R63.5 WEIGHT GAIN: ICD-10-CM

## 2025-07-21 DIAGNOSIS — R73.9 HYPERGLYCEMIA: ICD-10-CM

## 2025-07-21 DIAGNOSIS — E55.9 VITAMIN D DEFICIENCY: ICD-10-CM

## 2025-07-21 DIAGNOSIS — F41.9 ANXIETY AND DEPRESSION: ICD-10-CM

## 2025-07-21 DIAGNOSIS — E03.9 HYPOTHYROIDISM, UNSPECIFIED TYPE: ICD-10-CM

## 2025-07-21 DIAGNOSIS — I10 ESSENTIAL HYPERTENSION: Primary | ICD-10-CM

## 2025-07-21 DIAGNOSIS — J30.9 ALLERGIC RHINITIS, UNSPECIFIED SEASONALITY, UNSPECIFIED TRIGGER: ICD-10-CM

## 2025-07-21 PROCEDURE — G8427 DOCREV CUR MEDS BY ELIG CLIN: HCPCS | Performed by: NURSE PRACTITIONER

## 2025-07-21 PROCEDURE — 3078F DIAST BP <80 MM HG: CPT | Performed by: NURSE PRACTITIONER

## 2025-07-21 PROCEDURE — 1036F TOBACCO NON-USER: CPT | Performed by: NURSE PRACTITIONER

## 2025-07-21 PROCEDURE — G8417 CALC BMI ABV UP PARAM F/U: HCPCS | Performed by: NURSE PRACTITIONER

## 2025-07-21 PROCEDURE — 3074F SYST BP LT 130 MM HG: CPT | Performed by: NURSE PRACTITIONER

## 2025-07-21 PROCEDURE — 99214 OFFICE O/P EST MOD 30 MIN: CPT | Performed by: NURSE PRACTITIONER

## 2025-07-21 RX ORDER — PHENTERMINE HYDROCHLORIDE 37.5 MG/1
37.5 TABLET ORAL
Qty: 30 TABLET | Refills: 0 | Status: SHIPPED | OUTPATIENT
Start: 2025-07-21 | End: 2025-08-20

## 2025-07-21 ASSESSMENT — ENCOUNTER SYMPTOMS
BACK PAIN: 0
NAUSEA: 0
CHEST TIGHTNESS: 0
SHORTNESS OF BREATH: 0
VOMITING: 0
COUGH: 0
SINUS PAIN: 0
CONSTIPATION: 0
SINUS PRESSURE: 0
DIARRHEA: 0
SORE THROAT: 0
RHINORRHEA: 0
ABDOMINAL DISTENTION: 0

## 2025-07-21 NOTE — PROGRESS NOTES
Elvi Porter, APRN-CNP  [facility]  [facility] MEDICINE  [address] [address], [address]  [facility] [address]  Dept: [phone]  Dept Fax: [phone]     Patient ID: Lauren Monte is a 45 y.o. female.    History of Present Illness  The patient is a 45-year-old female presenting to the office today for follow-up on hypertension, hyperlipidemia, vitamin D deficiency, hypothyroidism, anxiety, depression, hyperglycemia, allergies, and fatigue. She is also here to discuss weight loss options.    She is currently on lisinopril and hydrochlorothiazide for hypertension management. For hyperlipidemia, she is taking Lipitor. Her levothyroxine dosage was recently increased to 50 mcg for hypothyroidism.    She is considering resuming Adipex as a weight loss measure, citing affordability as a factor. She has been on the medication in the past and did well with it. She has completed her 6 month holiday and would like to resume it. She has requested that the prescription be sent to [facility] due to their lower prices. She has repeat blood work scheduled in October or November 2025. She does not require any medication refills at this time.    Patient denies fever or chills. Pt today denies any HA, chest pain, or SOB. Pt denies any N/V/D/C or abdominal pain.  Otherwise pt doing well and voices no concerns today.      My previous office notes, labs and diagnostic studies were reviewed prior to and during encounter.  The patient's past medical, surgical, social, and family history as well as current medications and allergies were reviewed as documented in today's encounter by ABRAHAM Bain.     Current Outpatient Medications on File Prior to Visit   Medication Sig Dispense Refill    levothyroxine (SYNTHROID) 50 MCG tablet Take 1 tablet by mouth daily 90 tablet 1    tiZANidine (ZANAFLEX) 4 MG tablet Take 1 tablet by mouth 3 times daily 40 tablet 0    atorvastatin (LIPITOR) 10 MG tablet TAKE 1

## 2025-08-05 ENCOUNTER — TELEPHONE (OUTPATIENT)
Dept: FAMILY MEDICINE CLINIC | Age: 45
End: 2025-08-05

## 2025-08-05 DIAGNOSIS — M25.50 ARTHRALGIA, UNSPECIFIED JOINT: Primary | ICD-10-CM

## 2025-08-05 DIAGNOSIS — Z82.61 FAMILY HISTORY OF RHEUMATOID ARTHRITIS: ICD-10-CM

## 2025-08-19 ASSESSMENT — ENCOUNTER SYMPTOMS
SORE THROAT: 0
CHEST TIGHTNESS: 0
CONSTIPATION: 0
ABDOMINAL DISTENTION: 0
NAUSEA: 0
COUGH: 0
SINUS PAIN: 0
RHINORRHEA: 0
BACK PAIN: 0
SINUS PRESSURE: 0
ABDOMINAL PAIN: 0
SHORTNESS OF BREATH: 0
DIARRHEA: 0
VOMITING: 0

## 2025-08-20 ENCOUNTER — OFFICE VISIT (OUTPATIENT)
Dept: FAMILY MEDICINE CLINIC | Age: 45
End: 2025-08-20
Payer: COMMERCIAL

## 2025-08-20 VITALS
DIASTOLIC BLOOD PRESSURE: 82 MMHG | OXYGEN SATURATION: 99 % | HEART RATE: 69 BPM | TEMPERATURE: 97.1 F | WEIGHT: 234 LBS | HEIGHT: 62 IN | RESPIRATION RATE: 16 BRPM | SYSTOLIC BLOOD PRESSURE: 130 MMHG | BODY MASS INDEX: 43.06 KG/M2

## 2025-08-20 DIAGNOSIS — R63.5 WEIGHT GAIN: ICD-10-CM

## 2025-08-20 PROCEDURE — G8427 DOCREV CUR MEDS BY ELIG CLIN: HCPCS | Performed by: NURSE PRACTITIONER

## 2025-08-20 PROCEDURE — G8417 CALC BMI ABV UP PARAM F/U: HCPCS | Performed by: NURSE PRACTITIONER

## 2025-08-20 PROCEDURE — 99213 OFFICE O/P EST LOW 20 MIN: CPT | Performed by: NURSE PRACTITIONER

## 2025-08-20 PROCEDURE — 3075F SYST BP GE 130 - 139MM HG: CPT | Performed by: NURSE PRACTITIONER

## 2025-08-20 PROCEDURE — 1036F TOBACCO NON-USER: CPT | Performed by: NURSE PRACTITIONER

## 2025-08-20 PROCEDURE — 3079F DIAST BP 80-89 MM HG: CPT | Performed by: NURSE PRACTITIONER

## 2025-08-20 RX ORDER — PHENTERMINE HYDROCHLORIDE 37.5 MG/1
37.5 TABLET ORAL
Qty: 30 TABLET | Refills: 0 | Status: SHIPPED | OUTPATIENT
Start: 2025-08-20 | End: 2025-09-19

## 2025-08-26 ENCOUNTER — E-VISIT (OUTPATIENT)
Dept: FAMILY MEDICINE CLINIC | Age: 45
End: 2025-08-26
Payer: COMMERCIAL

## 2025-08-26 DIAGNOSIS — J01.90 ACUTE BACTERIAL SINUSITIS: Primary | ICD-10-CM

## 2025-08-26 DIAGNOSIS — B96.89 ACUTE BACTERIAL SINUSITIS: Primary | ICD-10-CM

## 2025-08-26 PROCEDURE — 99422 OL DIG E/M SVC 11-20 MIN: CPT | Performed by: NURSE PRACTITIONER

## 2025-08-26 RX ORDER — FLUTICASONE PROPIONATE 50 MCG
1 SPRAY, SUSPENSION (ML) NASAL DAILY
Qty: 32 G | Refills: 0 | Status: SHIPPED | OUTPATIENT
Start: 2025-08-26

## 2025-08-26 ASSESSMENT — LIFESTYLE VARIABLES: SMOKING_STATUS: NO, I'VE NEVER SMOKED

## (undated) DEVICE — SOLUTION IRRIG 3000ML 0.9% SOD CHL USP UROMATIC PLAS CONT

## (undated) DEVICE — SYRINGE ONLY,20ML LUER LOCK: Brand: MEDLINE INDUSTRIES, INC.

## (undated) DEVICE — DRAPE EQUIP C ARM 85X54 IN MINI CLR LF DISP

## (undated) DEVICE — SOLUTION IRRIG 1000ML 0.9% SOD CHL USP POUR PLAS BTL

## (undated) DEVICE — DRAPE,REIN 53X77,STERILE: Brand: MEDLINE

## (undated) DEVICE — APPLICATOR MEDICATED 26 CC SOLUTION HI LT ORNG CHLORAPREP

## (undated) DEVICE — STRAP,POSITIONING,KNEE/BODY,FOAM,4X60": Brand: MEDLINE

## (undated) DEVICE — ELECTRODE PT RET AD L9FT HI MOIST COND ADH HYDRGEL CORDED

## (undated) DEVICE — LIQUIBAND RAPID ADHESIVE 36/CS 0.8ML: Brand: MEDLINE

## (undated) DEVICE — DRAPE,U/ SHT,SPLIT,PLAS,STERIL: Brand: MEDLINE

## (undated) DEVICE — IMMOBILIZER ORTH CONTACT CLOSURE STRP LG 18X9 IN PROCARE

## (undated) DEVICE — MHPB HAND AND FOOT PACK: Brand: MEDLINE INDUSTRIES, INC.

## (undated) DEVICE — SYRINGE MED 30ML STD CLR PLAS LUERLOCK TIP N CTRL DISP

## (undated) DEVICE — GLOVE SURG SZ 65 L12IN THK75MIL DK GRN LTX FREE

## (undated) DEVICE — NEEDLES 2-0 SUTURETAPE MENIS REP MINIMUM ORDER 10 EACH

## (undated) DEVICE — MASTISOL ADHESIVE LIQ 2/3ML

## (undated) DEVICE — GLOVE ORTHO 7 1/2   MSG9475

## (undated) DEVICE — TUBING, SUCTION, 9/32" X 20', STRAIGHT: Brand: MEDLINE INDUSTRIES, INC.

## (undated) DEVICE — GLOVE SURG SZ 65 L12IN FNGR THK126MIL CRM LTX FREE

## (undated) DEVICE — NEEDLE HYPO 25GA L1.5IN BLU POLYPR HUB S STL REG BVL STR

## (undated) DEVICE — INTENDED FOR TISSUE SEPARATION, AND OTHER PROCEDURES THAT REQUIRE A SHARP SURGICAL BLADE TO PUNCTURE OR CUT.: Brand: BARD-PARKER ® CARBON RIB-BACK BLADES

## (undated) DEVICE — ANTERIOR CANNULA ZONENAVIGATOR

## (undated) DEVICE — BNDG,ELSTC,MATRIX,STRL,4"X5YD,LF,HOOK&LP: Brand: MEDLINE

## (undated) DEVICE — TUBING PMP L16FT MAIN DISP FOR AR-6400 AR-6475

## (undated) DEVICE — SUTURE VICRYL + SZ 3-0 L27IN ABSRB UD L26MM SH 1/2 CIR VCP416H

## (undated) DEVICE — PADDING,UNDERCAST,COTTON, 4"X4YD STERILE: Brand: MEDLINE

## (undated) DEVICE — SUTURE MONOCRYL SZ 3 0 L18IN ABSRB UD PS 2 3 8 CIR REV CUT NDL MCP497G

## (undated) DEVICE — GLOVE SURG SZ 8 L12IN THK75MIL DK GRN LTX FREE

## (undated) DEVICE — SUTURE PROL SZ 4-0 L18IN NONABSORBABLE BLU L16MM PC-3 3/8 8634G

## (undated) DEVICE — CANNULA SURG RT MIDDLE/POSTERIOR ZONENAVIGATOR SYS

## (undated) DEVICE — CLOTH PRE OP W9XL10.5IN 2% CHG FRAGRANCE RNS FREE READYPREP